# Patient Record
Sex: FEMALE | Race: WHITE | NOT HISPANIC OR LATINO | Employment: FULL TIME | URBAN - METROPOLITAN AREA
[De-identification: names, ages, dates, MRNs, and addresses within clinical notes are randomized per-mention and may not be internally consistent; named-entity substitution may affect disease eponyms.]

---

## 2021-02-24 ENCOUNTER — TELEPHONE (OUTPATIENT)
Dept: OBGYN CLINIC | Facility: CLINIC | Age: 26
End: 2021-02-24

## 2021-03-12 ENCOUNTER — OFFICE VISIT (OUTPATIENT)
Dept: OBGYN CLINIC | Facility: CLINIC | Age: 26
End: 2021-03-12
Payer: COMMERCIAL

## 2021-03-12 VITALS
WEIGHT: 234 LBS | DIASTOLIC BLOOD PRESSURE: 80 MMHG | TEMPERATURE: 33.8 F | BODY MASS INDEX: 43.06 KG/M2 | SYSTOLIC BLOOD PRESSURE: 128 MMHG | HEIGHT: 62 IN

## 2021-03-12 DIAGNOSIS — M76.31 ILIOTIBIAL BAND SYNDROME OF RIGHT SIDE: Primary | ICD-10-CM

## 2021-03-12 PROCEDURE — 99243 OFF/OP CNSLTJ NEW/EST LOW 30: CPT | Performed by: ORTHOPAEDIC SURGERY

## 2021-03-12 RX ORDER — LEVOTHYROXINE SODIUM 0.05 MG/1
50 TABLET ORAL DAILY
COMMUNITY
Start: 2021-02-17

## 2021-03-12 RX ORDER — BROMOCRIPTINE MESYLATE 5 MG/1
CAPSULE ORAL
COMMUNITY

## 2021-03-12 RX ORDER — CELECOXIB 100 MG/1
CAPSULE ORAL EVERY 24 HOURS
COMMUNITY
Start: 2021-03-10

## 2021-03-12 NOTE — PROGRESS NOTES
Assessment:     1  Iliotibial band syndrome of right side        Plan:     Problem List Items Addressed This Visit        Musculoskeletal and Integument    Iliotibial band syndrome of right side - Primary       Findings consistent with right leg IT band syndrome  Discussed findings and treatment options with the patient  Patient has localized tenderness with palpation over the lateral epicondyle and along the IT band  I provided patient cortisone injection over right knee lateral epicondyle, which patient tolerated well  I advised patient to continue use of the NSAID and attend physical therapy  I also advised patient to use over-the-counter Aspercreme or Voltaren gel over the leg  I will see patient back in 6 weeks for re-evaluation  Cold compress over the injection area  All patient's questions were answered to her satisfaction  This note is created using dictation transcription  It may contain typographical errors, grammatical errors, improperly dictated words, background noise and other errors  Relevant Orders    Ambulatory referral to Physical Therapy    Inj Trigger Point Single/Multiple (Completed)         Subjective:     Patient ID: Qi Brock is a 23 y o  female  Chief Complaint:    77-year-old female with new onset of right leg pain started more than a month ago  Patient's symptoms started after she ran and fell a popping sensation over her right leg  She started experiencing pain over her thigh down to her knee  The pain would radiate from the low area up toward the mid thigh  She denies pain in her back or groin  She is walking without use of any assistive device  She has been going to therapy for about 2 weeks now  Patient has been taking Celebrex that was prescribed by her family physician couple days ago  Information on patient's intake form was reviewed      Allergy:  Allergies   Allergen Reactions    Zithromax [Azithromycin] Rash     Medications:  all current active meds have been reviewed  Past Medical History:  Past Medical History:   Diagnosis Date    Disease of thyroid gland     hyperthyroid     Past Surgical History:  History reviewed  No pertinent surgical history  Family History:  Family History   Problem Relation Age of Onset    No Known Problems Mother      Social History:  Social History     Substance and Sexual Activity   Alcohol Use    Binge frequency: Less than monthly     Social History     Substance and Sexual Activity   Drug Use Not on file     Social History     Tobacco Use   Smoking Status Never Smoker   Smokeless Tobacco Current User     Review of Systems   Constitutional: Negative  HENT: Negative  Eyes: Negative  Respiratory: Negative  Cardiovascular: Negative  Gastrointestinal: Negative  Endocrine: Negative  Genitourinary: Negative  Musculoskeletal: Positive for arthralgias (Right upper leg)  Negative for gait problem and joint swelling  Skin: Negative  Allergic/Immunologic: Negative  Neurological: Negative  Hematological: Negative  Psychiatric/Behavioral: Negative  Objective:  BP Readings from Last 1 Encounters:   03/12/21 128/80      Wt Readings from Last 1 Encounters:   03/12/21 106 kg (234 lb)      BMI:   Estimated body mass index is 42 8 kg/m² as calculated from the following:    Height as of this encounter: 5' 2" (1 575 m)  Weight as of this encounter: 106 kg (234 lb)  BSA:   Estimated body surface area is 2 04 meters squared as calculated from the following:    Height as of this encounter: 5' 2" (1 575 m)  Weight as of this encounter: 106 kg (234 lb)  Physical Exam  Vitals signs and nursing note reviewed  Constitutional:       Appearance: She is well-developed  She is obese  HENT:      Head: Normocephalic and atraumatic  Right Ear: External ear normal       Left Ear: External ear normal    Eyes:      Extraocular Movements: Extraocular movements intact        Conjunctiva/sclera: Conjunctivae normal    Neck:      Musculoskeletal: Neck supple  Pulmonary:      Effort: Pulmonary effort is normal    Musculoskeletal:      Right knee: She exhibits no effusion  Skin:     General: Skin is warm and dry  Neurological:      Mental Status: She is alert and oriented to person, place, and time  Deep Tendon Reflexes: Reflexes are normal and symmetric  Psychiatric:         Mood and Affect: Mood normal          Behavior: Behavior normal        Right Knee Exam     Muscle Strength   The patient has normal right knee strength  Tenderness   Right knee tenderness location: Lateral epicondyle and along the IT band proximally  Range of Motion   The patient has normal right knee ROM  Tests   Haydee:  Medial - negative Lateral - negative  Varus: negative Valgus: negative  Patellar apprehension: negative    Other   Erythema: absent  Sensation: normal  Pulse: present  Swelling: none  Effusion: no effusion present      Right Hip Exam     Tenderness   The patient is experiencing no tenderness  Range of Motion   The patient has normal right hip ROM  Tests   CARLOS: negative  Ann-Marie: negative    Other   Erythema: absent  Sensation: normal  Pulse: present            No images for review today           Inj Trigger Point Single/Multiple     Date/Time 3/12/2021 2:49 PM     Performed by  Odessa Tom MD     Authorized by Odessa Tom MD      Universal Protocol   Consent: Verbal consent obtained  Risks and benefits: risks, benefits and alternatives were discussed  Consent given by: patient  Patient understanding: patient states understanding of the procedure being performed  Site marked: the operative site was marked      Local anesthesia used: no     Anesthesia   Local anesthesia used: no     Sedation   Patient sedated: no        Specimen: no    Culture: no   Procedure Details   Procedure Notes:  Right knee lateral epicondyle was injected with 1 cc 1% lidocaine and 0 5 cc Celestone    Injection was done under sterile condition  Patient tolerated the procedure well

## 2021-03-12 NOTE — ASSESSMENT & PLAN NOTE
Findings consistent with right leg IT band syndrome  Discussed findings and treatment options with the patient  Patient has localized tenderness with palpation over the lateral epicondyle and along the IT band  I provided patient cortisone injection over right knee lateral epicondyle, which patient tolerated well  I advised patient to continue use of the NSAID and attend physical therapy  I also advised patient to use over-the-counter Aspercreme or Voltaren gel over the leg  I will see patient back in 6 weeks for re-evaluation  Cold compress over the injection area  All patient's questions were answered to her satisfaction  This note is created using dictation transcription  It may contain typographical errors, grammatical errors, improperly dictated words, background noise and other errors

## 2021-04-21 ENCOUNTER — EVALUATION (OUTPATIENT)
Dept: PHYSICAL THERAPY | Facility: CLINIC | Age: 26
End: 2021-04-21
Payer: COMMERCIAL

## 2021-04-21 DIAGNOSIS — M76.31 ILIOTIBIAL BAND SYNDROME OF RIGHT SIDE: ICD-10-CM

## 2021-04-21 PROCEDURE — 97161 PT EVAL LOW COMPLEX 20 MIN: CPT | Performed by: PHYSICAL THERAPIST

## 2021-04-21 NOTE — PROGRESS NOTES
PT Evaluation     Today's date: 2021  Patient name: Hortencia Lester  : 1995  MRN: [id number]  Referring provider: Julius Funez MD  Dx:   Encounter Diagnosis     ICD-10-CM    1  Iliotibial band syndrome of right side  M76 31 Ambulatory referral to Physical Therapy                  Assessment  Assessment details: Hortencia Lester is a 27 y o  female presenting to outpatient physical therapy at [place] [address] with complaints of R lateral knee and thigh pain  She presents with decreased strength, limited ITB flexibility, poor patellar tracking, decreased tolerance to activity and decreased functional mobility due to Iliotibial band syndrome of right side  She would benefit from skilled PT services in order to address these deficits and reach maximum level of function  Thank you for the referral!  Impairments: abnormal or restricted ROM, activity intolerance, impaired physical strength, lacks appropriate home exercise program and pain with function  Barriers to therapy: None  Understanding of Dx/Px/POC: excellent  Goals  ST  Independent with HEP in 2 weeks  2  Good R patella tracking in 3 weeks     LT  Achieve FOTO score of 87/100 in 6 weeks   2  Able to get on and off of floor without R knee pain in 6 weeks  3  Strength R quadriceps and hip adductors = 5/5 in 6 weeks  4  No R ITB tightness in 6 weeks  5    No R knee tenderness in 6 weeks    Plan  Patient would benefit from: skilled PT and PT eval  Planned modality interventions: cryotherapy and thermotherapy: hydrocollator packs  Planned therapy interventions: ADL retraining, flexibility, functional ROM exercises, home exercise program, joint mobilization, manual therapy, neuromuscular re-education, postural training, strengthening, stretching, therapeutic activities and therapeutic exercise  Frequency: 2x week  Duration in weeks: 6  Treatment plan discussed with: patient        Subjective Evaluation    History of Present Illness  Mechanism of injury: Pt reports having R thigh pain since about 23 without cause  Her symptoms started after she ran and felt a popping sensation over her lateral R lateral knee  Her pain radiates from her knee up to her hip  She denies pain in her back or groin  She tried PT for about 2 weeks without pain relief  She had a lateral knee cortisone injection about 1 month ago with 2-3 days of pain relief  Works in   Unable to get on and off of floor due to lateral knee pain with min lateral thigh pain  Feels popping in lateral knee  Pt is morbidly obese  Recurrent probem    Quality of life: good    Pain  Current pain ratin  At best pain ratin  At worst pain ratin  Quality: tight and sharp  Aggravating factors: stair climbing and walking  Progression: no change    Social Support  Lives in: [place]  Lives with: parents and adult children    Employment status: working    Diagnostic Tests  No diagnostic tests performed  Treatments  Previous treatment: physical therapy, injection treatment and medication  Patient Goals  Patient goals for therapy: decreased pain, increased motion, increased strength, return to sport/leisure activities, independence with ADLs/IADLs and decreased edema          Objective     Observations     Right Knee   Negative for effusion  Tenderness     Right Knee   Tenderness in the ITB, lateral joint line and lateral patella  No tenderness in the medial joint line       Neurological Testing     Sensation     Knee   Left Knee   Intact: light touch    Right Knee   Intact: light touch     Active Range of Motion   Left Hip   Normal active range of motion    Right Hip   Normal active range of motion  Left Knee   Normal active range of motion    Right Knee   Normal active range of motion  Flexion: with pain    Mobility   Patellar Mobility:     Right Knee   WFL: superior and inferior  Hypermobile: lateral   Hypomobile: medial     Patellar Static Positioning     Additional Patellar Static Positioning Details  Mod lateral tracking R patella  Strength/Myotome Testing     Left Hip   Normal muscle strength    Right Hip   Planes of Motion   Flexion: 5  Extension: 5  Abduction: 5  Adduction: 4+    Left Knee   Flexion: 5  Extension: 5    Right Knee   Flexion: 5  Extension: 4+    Tests     Right Knee   Positive patellar compression  Negative Apley's compression, lateral Haydee and varus stress test at 0 degrees       Ambulation     Observational Gait   Gait: within functional limits              POC EXPIRES On:   6/2/21  PRECAUTIONS:  None  CO-MORBIDITES:  Morbid obesity  PERSONAL FACTORS:  None      Manuals HEP 4/21           Graston distal R ITB  8'           Medial patellar mobs to increase med glide  2'                                     Neuro Re-Ed                                                                                                Ther Ex    Standing R ITB stretch 4/21 20" 3           Supine strap R ITB stretch 4/21 20" 3           Supine SLR R             S/L R hip adduction             Bridging with ball adduction             TB R hip adduction                                       Ther Activity                              Gait Training                              Modalities

## 2021-04-23 ENCOUNTER — OFFICE VISIT (OUTPATIENT)
Dept: PHYSICAL THERAPY | Facility: CLINIC | Age: 26
End: 2021-04-23
Payer: COMMERCIAL

## 2021-04-23 DIAGNOSIS — M76.31 ILIOTIBIAL BAND SYNDROME OF RIGHT SIDE: Primary | ICD-10-CM

## 2021-04-23 PROCEDURE — 97110 THERAPEUTIC EXERCISES: CPT

## 2021-04-23 PROCEDURE — 97140 MANUAL THERAPY 1/> REGIONS: CPT

## 2021-04-23 NOTE — PROGRESS NOTES
Daily Note     Today's date: 2021  Patient name: Purvi Alex  : 1995  MRN: [id number]  Referring provider: Berta Mendieta MD  Dx:   Encounter Diagnosis     ICD-10-CM    1  Iliotibial band syndrome of right side  M76 31                   Subjective: Pt reports no real pain upon presentation  Notes more achiness in her R ITB region near her knee  Objective: See treatment diary below      Assessment: Tolerated treatment well  Added in CARLOS stretch manually as well as medial patella glide taping with good tolerance  Pt did demonstrate quad and adductor weakness but overall is improving  Patient demonstrated fatigue post treatment, exhibited good technique with therapeutic exercises and would benefit from continued PT      Plan: Continue per plan of care        POC EXPIRES On:   21  PRECAUTIONS:  None  CO-MORBIDITES:  Morbid obesity  PERSONAL FACTORS:  None      Manuals HEP           Graston distal R ITB  8' 8'          Medial patellar mobs to increase med glide  2' 2' PROM          ITB stretch   MM 3'          Patellar medial glide tape   4'                       Neuro Re-Ed                                                                                                Ther Ex    Standing R ITB stretch  20" 3 20"x3          Supine strap R ITB stretch  20" 3 20"x3          Supine SLR R   2x10          S/L R hip adduction   2x10          Bridging with ball adduction   2x10          TB R hip adduction   otb 2x10                                    Ther Activity                              Gait Training                              Modalities

## 2021-04-28 ENCOUNTER — APPOINTMENT (OUTPATIENT)
Dept: PHYSICAL THERAPY | Facility: CLINIC | Age: 26
End: 2021-04-28
Payer: COMMERCIAL

## 2021-04-30 ENCOUNTER — OFFICE VISIT (OUTPATIENT)
Dept: OBGYN CLINIC | Facility: CLINIC | Age: 26
End: 2021-04-30
Payer: COMMERCIAL

## 2021-04-30 ENCOUNTER — OFFICE VISIT (OUTPATIENT)
Dept: PHYSICAL THERAPY | Facility: CLINIC | Age: 26
End: 2021-04-30
Payer: COMMERCIAL

## 2021-04-30 VITALS
TEMPERATURE: 99.5 F | DIASTOLIC BLOOD PRESSURE: 78 MMHG | SYSTOLIC BLOOD PRESSURE: 116 MMHG | HEIGHT: 62 IN | BODY MASS INDEX: 44.16 KG/M2 | WEIGHT: 240 LBS

## 2021-04-30 DIAGNOSIS — M76.31 ILIOTIBIAL BAND SYNDROME OF RIGHT SIDE: Primary | ICD-10-CM

## 2021-04-30 PROCEDURE — 99213 OFFICE O/P EST LOW 20 MIN: CPT | Performed by: ORTHOPAEDIC SURGERY

## 2021-04-30 PROCEDURE — 97110 THERAPEUTIC EXERCISES: CPT | Performed by: PHYSICAL THERAPIST

## 2021-04-30 PROCEDURE — 97140 MANUAL THERAPY 1/> REGIONS: CPT | Performed by: PHYSICAL THERAPIST

## 2021-04-30 NOTE — PROGRESS NOTES
Assessment:     1  Iliotibial band syndrome of right side        Plan:    The patient was seen and examined by Dr Josee Gaffney and myself  Problem List Items Addressed This Visit        Musculoskeletal and Integument    Iliotibial band syndrome of right side - Primary       Findings consistent with right leg IT band syndrome  Findings and treatment options were discussed with the patient  Discussed that this condition will take more time to resolve with formal physical therapy  She is to continue to avoid high impact activities  Continue home exercises and stretches as well  Follow-up in 6-8 weeks for re-evaluation  All patient's questions were answered to her satisfaction  This note is created using dictation transcription  It may contain typographical errors, grammatical errors, improperly dictated words, background noise and other errors  Subjective:     Patient ID: Bharat Hwang is a 27 y o  female  Chief Complaint:    73-year-old   Female accompanied by her mother following up for right IT band syndrome  She was last seen about 6 weeks ago  She received a cortisone injection to the lateral epicondyle of the femur  Patient states she had complete pain relief for about 3 days  She did not start physical therapy until about a week ago  She continues to have pain over the lateral aspect of the distal femur  She has been avoiding high impact activities  Allergy:  Allergies   Allergen Reactions    Zithromax [Azithromycin] Rash     Medications:  all current active meds have been reviewed  Past Medical History:  Past Medical History:   Diagnosis Date    Disease of thyroid gland     hyperthyroid     Past Surgical History:  History reviewed  No pertinent surgical history    Family History:  Family History   Problem Relation Age of Onset    No Known Problems Mother      Social History:  Social History     Substance and Sexual Activity   Alcohol Use    Binge frequency: Less than monthly     Social History     Substance and Sexual Activity   Drug Use Not on file     Social History     Tobacco Use   Smoking Status Never Smoker   Smokeless Tobacco Current User     Review of Systems   Constitutional: Negative  HENT: Negative  Eyes: Negative  Respiratory: Negative  Cardiovascular: Negative  Gastrointestinal: Negative  Endocrine: Negative  Genitourinary: Negative  Musculoskeletal: Positive for arthralgias (Right upper leg)  Negative for gait problem and joint swelling  Skin: Negative  Allergic/Immunologic: Negative  Neurological: Negative  Hematological: Negative  Psychiatric/Behavioral: Negative  Objective:  BP Readings from Last 1 Encounters:   04/30/21 116/78      Wt Readings from Last 1 Encounters:   04/30/21 109 kg (240 lb)      BMI:   Estimated body mass index is 43 9 kg/m² as calculated from the following:    Height as of this encounter: 5' 2" (1 575 m)  Weight as of this encounter: 109 kg (240 lb)  BSA:   Estimated body surface area is 2 07 meters squared as calculated from the following:    Height as of this encounter: 5' 2" (1 575 m)  Weight as of this encounter: 109 kg (240 lb)  Physical Exam  Vitals signs and nursing note reviewed  Constitutional:       Appearance: She is well-developed  She is obese  HENT:      Head: Normocephalic and atraumatic  Right Ear: External ear normal       Left Ear: External ear normal    Eyes:      Extraocular Movements: Extraocular movements intact  Conjunctiva/sclera: Conjunctivae normal    Neck:      Musculoskeletal: Neck supple  Pulmonary:      Effort: Pulmonary effort is normal    Musculoskeletal:      Right knee: She exhibits no effusion  Skin:     General: Skin is warm and dry  Neurological:      Mental Status: She is alert and oriented to person, place, and time  Deep Tendon Reflexes: Reflexes are normal and symmetric     Psychiatric:         Mood and Affect: Mood normal          Behavior: Behavior normal        Right Knee Exam     Muscle Strength   The patient has normal right knee strength  Tenderness   Right knee tenderness location: Lateral epicondyle femur  Range of Motion   The patient has normal right knee ROM      Tests   Haydee:  Medial - negative Lateral - negative  Varus: negative Valgus: negative  Patellar apprehension: negative    Other   Erythema: absent  Sensation: normal  Pulse: present  Swelling: none  Effusion: no effusion present            No images for review today     Procedures

## 2021-04-30 NOTE — PROGRESS NOTES
Daily Note     Today's date: 2021  Patient name: Jerome Vazquez  : 1995  MRN: [id number]  Referring provider: Maribel Barrientos MD  Dx:   Encounter Diagnosis     ICD-10-CM    1  Iliotibial band syndrome of right side  M76 31                   Subjective: Pt says that her pain isn't as bad, but it still feels swollen  She notes that her R knee also pops when she walks  Objective: See treatment diary below      Assessment: Tolerated treatment fair, with high levels of symptom exacerbation  Patient displayed significant hypertonicity and TTP of R distal ITB, as well as distal Vastus Lateralis  Hip flexor decreased elasticity noted with significant pelvis tilting during prone quad stretching  She was unable to keep the knee in extension during S/L adduction and SLR, indicating quad weakness  She would benefit from continued skilled PT to address these deficits         Plan: Initiate quad strengthening     POC EXPIRES On:   21  PRECAUTIONS:  None  CO-MORBIDITES:  Morbid obesity  PERSONAL FACTORS:  None      Manuals HEP          Graston distal R ITB  8' 8' 8'+R quad         Medial patellar mobs to increase med glide  2' 2' PROM 2'         ITB stretch   MM 3' 5'         Patellar medial glide tape   4'                       Neuro Re-Ed                                                                                                Ther Ex    Stationary Bike    5'         Standing R ITB stretch  20" 3 20"x3 20" x2         Supine strap R ITB stretch  20" 3 20"x3 4 x20"         Supine SLR R   2x10 2 x10         S/L R hip adduction   2x10 2 x10         Bridging with ball adduction   2x10 2 x10         Prone quad stretch    4 x20"         TB R hip adduction   otb 2x10 np         LAQ    nv                      Ther Activity                              Gait Training                              Modalities

## 2021-04-30 NOTE — ASSESSMENT & PLAN NOTE
Findings consistent with right leg IT band syndrome  Findings and treatment options were discussed with the patient  Discussed that this condition will take more time to resolve with formal physical therapy  She is to continue to avoid high impact activities  Continue home exercises and stretches as well  Follow-up in 6-8 weeks for re-evaluation  All patient's questions were answered to her satisfaction  This note is created using dictation transcription  It may contain typographical errors, grammatical errors, improperly dictated words, background noise and other errors

## 2021-05-05 ENCOUNTER — APPOINTMENT (OUTPATIENT)
Dept: PHYSICAL THERAPY | Facility: CLINIC | Age: 26
End: 2021-05-05
Payer: COMMERCIAL

## 2021-05-07 ENCOUNTER — OFFICE VISIT (OUTPATIENT)
Dept: PHYSICAL THERAPY | Facility: CLINIC | Age: 26
End: 2021-05-07
Payer: COMMERCIAL

## 2021-05-07 DIAGNOSIS — M76.31 ILIOTIBIAL BAND SYNDROME OF RIGHT SIDE: Primary | ICD-10-CM

## 2021-05-07 PROCEDURE — 97140 MANUAL THERAPY 1/> REGIONS: CPT | Performed by: PHYSICAL THERAPIST

## 2021-05-07 PROCEDURE — 97110 THERAPEUTIC EXERCISES: CPT | Performed by: PHYSICAL THERAPIST

## 2021-05-07 NOTE — PROGRESS NOTES
Daily Note     Today's date: 2021  Patient name: Davide Choudhary  : 1995  MRN: [id number]  Referring provider: Nico Lepe MD  Dx:   Encounter Diagnosis     ICD-10-CM    1  Iliotibial band syndrome of right side  M76 31                   Subjective: Pt reports no new changes with her pain  She notes that she has been doing her HEP with her brother  Objective: See treatment diary below      Assessment: Tolerated treatment well  Patient displays significant R hip flexor vs  Adductor elasticity deficits, indicated by her inability to achieve full bridging despite glute and core activation  Following manual therapy the pt was able to achieve greater ROM in bridging  Pt unable to achieve hip extension in S/L secondary to anterior hip elasticity limitations, inhibiting her glute med ctivation, causing her to compensate with the TFL  Pt was given hip flexor and adductor stretches to add to HEP in order to correct these deficits  She would benefit from continued skilled PT in order to restore normal function         Plan: Glute med strengthening, address elastic tissue limitations      POC EXPIRES On:   21  PRECAUTIONS:  None  CO-MORBIDITES:  Morbid obesity  PERSONAL FACTORS:  None      Manuals HEP  5        Graston distal R ITB  8' 8' 8'+R quad         Medial patellar mobs to increase med glide  2' 2' PROM 2'         ITB stretch   MM 3' 5'         Patellar medial glide tape   4'          Psoas Release R     6'        R adductor stretch     4'                     Neuro Re-Ed                                                                                                Ther Ex    Stationary Bike    5' 6'        Standing R ITB stretch  20" 3 20"x3 20" x2 HEP        Standing Adductor stretch B/L     20"x4        Supine strap R ITB stretch  20" 3 20"x3 4 x20" HEP        James stretch R     3 x30"        Supine SLR R   2x10 2 x10         S/L R hip adduction   2x10 2 x10 2 x10 Bridging with ball adduction   2x10 2 x10 2 x10        Prone quad stretch    4 x20" HEP        TB R hip adduction   otb 2x10 np         LAQ B/L    nv x10                      Ther Activity                              Gait Training                              Modalities

## 2022-03-03 ENCOUNTER — HOSPITAL ENCOUNTER (EMERGENCY)
Facility: HOSPITAL | Age: 27
Discharge: HOME | End: 2022-03-04
Attending: EMERGENCY MEDICINE
Payer: COMMERCIAL

## 2022-03-03 ENCOUNTER — TELEPHONE (OUTPATIENT)
Dept: OBGYN CLINIC | Facility: CLINIC | Age: 27
End: 2022-03-03

## 2022-03-03 VITALS
BODY MASS INDEX: 44.16 KG/M2 | WEIGHT: 240 LBS | TEMPERATURE: 98.3 F | HEIGHT: 62 IN | HEART RATE: 88 BPM | DIASTOLIC BLOOD PRESSURE: 79 MMHG | OXYGEN SATURATION: 98 % | SYSTOLIC BLOOD PRESSURE: 172 MMHG | RESPIRATION RATE: 16 BRPM

## 2022-03-03 DIAGNOSIS — N93.8 DYSFUNCTIONAL UTERINE BLEEDING: Primary | ICD-10-CM

## 2022-03-03 DIAGNOSIS — I10 HYPERTENSION: ICD-10-CM

## 2022-03-03 PROCEDURE — 99284 EMERGENCY DEPT VISIT MOD MDM: CPT

## 2022-03-03 PROCEDURE — 81025 URINE PREGNANCY TEST: CPT | Performed by: EMERGENCY MEDICINE

## 2022-03-03 PROCEDURE — 99284 EMERGENCY DEPT VISIT MOD MDM: CPT | Performed by: EMERGENCY MEDICINE

## 2022-03-03 RX ORDER — DESOGESTREL AND ETHINYL ESTRADIOL 0.15-0.03
1 KIT ORAL DAILY
COMMUNITY

## 2022-03-03 NOTE — TELEPHONE ENCOUNTER
Patient states has a vaginal cyst that is growing, and for the past week passing clots  Stated prior gyn who she saw one time did not do anything for her  Will only see a female  Instructed to proceed to a [facility] for evaluation since she declined appointments offered with male providers

## 2022-03-04 LAB
EXT PREG TEST URINE: NEGATIVE
EXT. CONTROL ED NAV: NORMAL

## 2022-03-04 NOTE — ED PROVIDER NOTES
History  Chief Complaint   Patient presents with    Vaginal Bleeding     started period on , heavier than normal, medium sized clots, soaking through 2 pads an hour  28 yo F with PMH of PCOS, hypothyroid presents to ED with a week of heavy period  She is on ocp  She has had similar in past  She was a little lightheaded yesterday, but not today  No CP/SOB or syncope  No urinary sx or abd pain or cramping  No fevers  No N/V  No rashes/lesions  She didn't want to see a male gynecologist at her normal practice, so they recommended she come to ED for eval        History provided by:  Patient and medical records   used: No    Vaginal Bleeding  Quality:  Clots  Severity:  Moderate  Onset quality:  Gradual  Duration:  1 week  Timing:  Constant  Progression:  Unchanged  Chronicity:  Recurrent  Associated symptoms: no abdominal pain, no back pain, no dizziness, no fatigue, no fever and no nausea        Prior to Admission Medications   Prescriptions Last Dose Informant Patient Reported? Taking?   bromocriptine (PARLODEL) 5 MG capsule   Yes No   Si capsule   celecoxib (CeleBREX) 100 mg capsule   Yes No   Sig: every 24 hours   desogestrel-ethinyl estradiol (APRI) 0 15-30 MG-MCG per tablet   Yes Yes   Sig: Take 1 tablet by mouth daily   levothyroxine 50 mcg tablet   Yes No   Sig: Take 50 mcg by mouth daily   norethindrone (AYGESTIN) 5 mg tablet   Yes No   Sig: Take 5 mg by mouth daily      Facility-Administered Medications: None       Past Medical History:   Diagnosis Date    Disease of thyroid gland     hyperthyroid    Hypertension        History reviewed  No pertinent surgical history  Family History   Problem Relation Age of Onset    No Known Problems Mother      I have reviewed and agree with the history as documented      E-Cigarette/Vaping    E-Cigarette Use Never User      E-Cigarette/Vaping Substances    Nicotine No     THC No     CBD No     Flavoring No     Other No  Unknown No      Social History     Tobacco Use    Smoking status: Never Smoker    Smokeless tobacco: Current User   Vaping Use    Vaping Use: Never used   Substance Use Topics    Alcohol use: Yes     Comment: socially     Drug use: Never       Review of Systems   Constitutional: Negative for appetite change, chills, fatigue and fever  HENT: Negative for congestion, ear pain, rhinorrhea, sore throat, trouble swallowing and voice change  Eyes: Negative for pain and visual disturbance  Respiratory: Negative for cough, chest tightness and shortness of breath  Cardiovascular: Negative for chest pain, palpitations and leg swelling  Gastrointestinal: Negative for abdominal pain, blood in stool, constipation, diarrhea, nausea and vomiting  Genitourinary: Positive for vaginal bleeding  Negative for difficulty urinating and hematuria  Musculoskeletal: Negative for back pain, neck pain and neck stiffness  Skin: Negative for rash  Neurological: Negative for dizziness, syncope, speech difficulty, light-headedness and headaches  Psychiatric/Behavioral: Negative for confusion and suicidal ideas  Physical Exam  Physical Exam  Vitals and nursing note reviewed  Constitutional:       General: She is not in acute distress  Appearance: She is well-developed  She is obese  She is not ill-appearing, toxic-appearing or diaphoretic  HENT:      Head: Normocephalic and atraumatic  Right Ear: External ear normal       Left Ear: External ear normal       Nose: Nose normal    Eyes:      General: No scleral icterus  Right eye: No discharge  Left eye: No discharge  Conjunctiva/sclera: Conjunctivae normal       Pupils: Pupils are equal, round, and reactive to light  Neck:      Trachea: No tracheal deviation  Cardiovascular:      Rate and Rhythm: Normal rate and regular rhythm  Pulses: Normal pulses  Heart sounds: Normal heart sounds  No murmur heard    No friction rub  No gallop  Pulmonary:      Effort: Pulmonary effort is normal  No respiratory distress  Breath sounds: Normal breath sounds  No stridor  Chest:      Chest wall: No tenderness  Abdominal:      General: Bowel sounds are normal       Palpations: Abdomen is soft  Tenderness: There is no abdominal tenderness  There is no right CVA tenderness, guarding or rebound  Genitourinary:     Comments: Mild to moderate dark blood in vault  No hemorrhage or active bleeding  No lacerations or rash or tenderness  Musculoskeletal:         General: No deformity  Normal range of motion  Cervical back: Normal range of motion and neck supple  Lymphadenopathy:      Cervical: No cervical adenopathy  Skin:     General: Skin is warm and dry  Findings: No rash  Neurological:      General: No focal deficit present  Mental Status: She is alert and oriented to person, place, and time  Cranial Nerves: No cranial nerve deficit  Sensory: No sensory deficit        Coordination: Coordination normal    Psychiatric:         Behavior: Behavior normal          Vital Signs  ED Triage Vitals [03/03/22 2309]   Temperature Pulse Respirations Blood Pressure SpO2   98 3 °F (36 8 °C) 94 18 (!) 182/96 98 %      Temp Source Heart Rate Source Patient Position - Orthostatic VS BP Location FiO2 (%)   Oral Monitor Lying Right arm --      Pain Score       5           Vitals:    03/03/22 2309 03/03/22 2330   BP: (!) 182/96 (!) 172/79   Pulse: 94 88   Patient Position - Orthostatic VS: Lying          Visual Acuity      ED Medications  Medications - No data to display    Diagnostic Studies  Results Reviewed     Procedure Component Value Units Date/Time    POCT pregnancy, urine [[id number]]  (Normal) Resulted: 03/04/22 0004    Lab Status: Final result Updated: 03/04/22 0004     EXT PREG TEST UR (Ref: Negative) negative     Control valid                 No orders to display              Procedures  Procedures         ED Course  ED Course as of 03/04/22 0005   Thu Mar 03, 2022   2344 No hemorrhage on exam  Pt in no distress  Recurrent issue  Will check upreg and d/c home to f/u with GYN and PCP  Discussed motrin prn and RTED instructions  MDM  Number of Diagnoses or Management Options  Dysfunctional uterine bleeding: new and requires workup  Hypertension: new and requires workup     Amount and/or Complexity of Data Reviewed  Clinical lab tests: ordered and reviewed  Review and summarize past medical records: yes    Risk of Complications, Morbidity, and/or Mortality  Presenting problems: low  Diagnostic procedures: low  Management options: low    Patient Progress  Patient progress: stable      Disposition  Final diagnoses:   Dysfunctional uterine bleeding   Hypertension     Time reflects when diagnosis was documented in both MDM as applicable and the Disposition within this note     Time User Action Codes Description Comment    3/3/2022 11:43 PM Ivan Angela [N93 8] Dysfunctional uterine bleeding     3/3/2022 11:43 PM Ivan Angela [I10] Hypertension       ED Disposition     ED Disposition Condition Date/Time Comment    Discharge Stable Thu Mar 3, 2022 11:43 PM Barbara Rivera discharge to home/self care  Follow-up Information     Follow up With Specialties Details Why Contact Info Additional Information    Triston Robertson DO Family Medicine Schedule an appointment as soon as possible for a visit   [address] [facility] [facility] [address]  [facility] [address]  [facility] Emergency Department Emergency Medicine  If symptoms worsen [address] [place], [address]  [address] [facility], [address] [address]          Patient's Medications   Discharge Prescriptions    No medications on file       No discharge procedures on file      PDMP Review     None          ED Provider  Electronically Signed by           Aaron Leija MD  03/04/22 0005

## 2022-03-04 NOTE — ED NOTES
Pt reports getting period bi weekly for the last 1-2 months and takes her OCP daily around the same time        Alexandria Pelletier, RN  03/03/22 [address], RN  03/03/22 8553

## 2022-03-24 ENCOUNTER — TELEPHONE (OUTPATIENT)
Dept: PSYCHIATRY | Facility: CLINIC | Age: 27
End: 2022-03-24

## 2022-03-28 ENCOUNTER — OFFICE VISIT (OUTPATIENT)
Dept: OBGYN CLINIC | Facility: CLINIC | Age: 27
End: 2022-03-28
Payer: COMMERCIAL

## 2022-03-28 VITALS
WEIGHT: 216 LBS | HEIGHT: 61 IN | BODY MASS INDEX: 40.78 KG/M2 | DIASTOLIC BLOOD PRESSURE: 98 MMHG | SYSTOLIC BLOOD PRESSURE: 150 MMHG

## 2022-03-28 DIAGNOSIS — R79.89 ELEVATED PROLACTIN LEVEL: ICD-10-CM

## 2022-03-28 DIAGNOSIS — F41.9 ANXIETY: ICD-10-CM

## 2022-03-28 DIAGNOSIS — N91.2 AMENORRHEA: ICD-10-CM

## 2022-03-28 DIAGNOSIS — Z12.4 ENCOUNTER FOR PAPANICOLAOU SMEAR FOR CERVICAL CANCER SCREENING: ICD-10-CM

## 2022-03-28 DIAGNOSIS — Z11.3 SCREEN FOR STD (SEXUALLY TRANSMITTED DISEASE): ICD-10-CM

## 2022-03-28 DIAGNOSIS — R03.0 ELEVATED BP WITHOUT DIAGNOSIS OF HYPERTENSION: ICD-10-CM

## 2022-03-28 DIAGNOSIS — E03.9 HYPOTHYROIDISM, UNSPECIFIED TYPE: ICD-10-CM

## 2022-03-28 DIAGNOSIS — Z01.419 ENCOUNTER FOR GYNECOLOGICAL EXAMINATION WITHOUT ABNORMAL FINDING: Primary | ICD-10-CM

## 2022-03-28 LAB — SL AMB POCT URINE HCG: NEGATIVE

## 2022-03-28 PROCEDURE — 81025 URINE PREGNANCY TEST: CPT | Performed by: NURSE PRACTITIONER

## 2022-03-28 PROCEDURE — 99385 PREV VISIT NEW AGE 18-39: CPT | Performed by: NURSE PRACTITIONER

## 2022-03-28 RX ORDER — TRAZODONE HYDROCHLORIDE 50 MG/1
TABLET ORAL
COMMUNITY
Start: 2022-02-25

## 2022-03-28 NOTE — PROGRESS NOTES
Assessment/Plan:  Pap every 3 years if normal, STI testing as indicated, exercise most days of week, obtain appropriate diet and hydration, Calcium 1000mg + 600 vit D daily, birth control as directed  Annual mammogram starting at age 36, monthly breast self exam    Blood work to check Prolactin and thyroid function  Monitor cycles   Return 1 month for BP check if elevated will need to Discuss alternate birth control        Diagnoses and all orders for this visit:    Encounter for gynecological examination without abnormal finding  -     Thinprep Tis Pap Reflex HPV mRNA E6/E7, Chlamydia/N gonorrhoeae    Amenorrhea  -     POCT urine HCG    Encounter for Papanicolaou smear for cervical cancer screening  -     Thinprep Tis Pap Reflex HPV mRNA E6/E7, Chlamydia/N gonorrhoeae    Hypothyroidism, unspecified type  -     TSH, 3rd generation with Free T4 reflex; Future  -     TSH, 3rd generation with Free T4 reflex    Elevated prolactin level  -     Prolactin; Future  -     Prolactin    Screen for STD (sexually transmitted disease)  -     Thinprep Tis Pap Reflex HPV mRNA E6/E7, Chlamydia/N gonorrhoeae    Other orders  -     traZODone (DESYREL) 50 mg tablet          Subjective:      Patient ID: Lena Amezquita is a 24 y o  female  Here for annual gyn On OCP Last month period x 2 weeks Heavy clotty Started the week before the placebo pills Hast No period yet this month and is on last week of pill pack  H/o PCOS  H/o prolactinemia On Parlodel Denies nipple drainage  Hypothyroid on Synthroid  Saw endocrine last month is due for labs prior to return in May   She has been on OCP x 2years Periods lighter monthly normal Denies abdominal or pelvic pain  Bowel and bladder are normal  IS c/o breast tenderness   H/o vaginal cyst which was removed 2 years ago Thought it may be returning   Has never had a pap smear       The following portions of the patient's history were reviewed and updated as appropriate: allergies, current medications, past family history, past medical history, past social history, past surgical history and problem list     Review of Systems   Constitutional: Negative for chills and fever  Gastrointestinal: Negative for abdominal pain, constipation and diarrhea  Genitourinary: Positive for menstrual problem  Negative for dysuria, frequency and pelvic pain           Objective:      /98   Ht 5' 0 75" (1 543 m)   Wt 98 kg (216 lb)   LMP 02/12/2022 (Exact Date)   Breastfeeding No   BMI 41 15 kg/m²          Physical Exam

## 2022-03-28 NOTE — PATIENT INSTRUCTIONS
Pap every 3 years if normal, STI testing as indicated, exercise most days of week, obtain appropriate diet and hydration, Calcium 1000mg + 600 vit D daily, birth control as directed  Annual mammogram starting at age 36, monthly breast self exam    Blood work to check Prolactin and thyroid function  Monitor cycles   Return 1 month for BP check

## 2022-03-29 LAB
PROLACTIN SERPL-MCNC: 18.7 NG/ML
TSH SERPL-ACNC: 2.45 MIU/L

## 2022-03-30 ENCOUNTER — TELEPHONE (OUTPATIENT)
Dept: OBGYN CLINIC | Facility: CLINIC | Age: 27
End: 2022-03-30

## 2022-03-30 PROBLEM — E03.9 HYPOTHYROIDISM: Status: ACTIVE | Noted: 2022-03-30

## 2022-03-30 PROBLEM — F41.9 ANXIETY: Status: ACTIVE | Noted: 2022-03-30

## 2022-03-30 PROBLEM — R79.89 ELEVATED PROLACTIN LEVEL: Status: ACTIVE | Noted: 2022-03-30

## 2022-03-31 NOTE — TELEPHONE ENCOUNTER
Attempted to call pt and emergency contact left message on emergency contact's voicemail to have the pt contact the office

## 2022-03-31 NOTE — TELEPHONE ENCOUNTER
Pt returned a call to the office and reports her phone is not turned on and if you need to contact her, may call her boyfriends Sumanth's phone at [phone]  Pt's boyfriend was on the phone call at time of call  Medication clarification addressed, pt informed she was taking both OCP Apri and Aygestin,  reports she has been  taking Aygestin on heavy bleeding days, prescribed by Dr Magda Juarez, and informed  prescribing doctor was aware she was taking both  Pt was unable to recall location of prior GYN  Addressed concern for elevated BP and contradiction taking OCP with elevated BP and need to f/u with PCP  Pt informed she does not have an appointment with her PCP  Discussed with Kiesha Hilton  And has requested to have pt return for an OC  Pt scheduled to be seen 4/6/22 for BP check and medication consult

## 2022-03-31 NOTE — TELEPHONE ENCOUNTER
Have been trying to reach pt since visit unable to leave voice mail on pt cell  Called emergency contact requested pt to call us re medications  On OCP but also noted on list is Aygestin/Norethridone  Need to verify if she is really taking both medications If she is how long has she been on both and who prescribed the aygestin  Also BP high in office was anxious but need to repeat in 1 month Cannot stay on OCP if BP elevated Pt should f/u Primary care doctor for further eval Believe she is seeing him in May

## 2022-04-01 LAB
C TRACH RRNA SPEC QL NAA+PROBE: NOT DETECTED
CLINICAL INFO: NORMAL
CYTO CVX: NORMAL
CYTOLOGY CMNT CVX/VAG CYTO-IMP: NORMAL
DATE PREVIOUS BX: NORMAL
LMP START DATE: NORMAL
N GONORRHOEA RRNA SPEC QL NAA+PROBE: NOT DETECTED
SL AMB PREV. PAP:: NORMAL
SPECIMEN SOURCE CVX/VAG CYTO: NORMAL

## 2022-04-06 ENCOUNTER — OFFICE VISIT (OUTPATIENT)
Dept: OBGYN CLINIC | Facility: CLINIC | Age: 27
End: 2022-04-06
Payer: COMMERCIAL

## 2022-04-06 VITALS
HEIGHT: 60 IN | BODY MASS INDEX: 42.64 KG/M2 | DIASTOLIC BLOOD PRESSURE: 72 MMHG | WEIGHT: 217.2 LBS | SYSTOLIC BLOOD PRESSURE: 130 MMHG

## 2022-04-06 DIAGNOSIS — R03.0 BLOOD PRESSURE ELEVATED WITHOUT HISTORY OF HTN: ICD-10-CM

## 2022-04-06 DIAGNOSIS — N92.1 MENORRHAGIA WITH IRREGULAR CYCLE: Primary | ICD-10-CM

## 2022-04-06 DIAGNOSIS — Z30.011 ENCOUNTER FOR PRESCRIPTION OF ORAL CONTRACEPTIVES: ICD-10-CM

## 2022-04-06 PROCEDURE — 99213 OFFICE O/P EST LOW 20 MIN: CPT | Performed by: NURSE PRACTITIONER

## 2022-04-06 RX ORDER — BUPROPION HYDROCHLORIDE 300 MG/1
1 TABLET ORAL EVERY MORNING
COMMUNITY
Start: 2022-03-31

## 2022-04-06 RX ORDER — NORGESTIMATE AND ETHINYL ESTRADIOL 0.25-0.035
1 KIT ORAL DAILY
Qty: 28 TABLET | Refills: 3 | Status: SHIPPED | OUTPATIENT
Start: 2022-04-06

## 2022-04-06 NOTE — PATIENT INSTRUCTIONS
norethridone 1 three times a day x 2 days then twice a day for 2 days then daily until period stops   Once period stops start new birth control pill  Give 3 months to adjust  F/u 3-4 months for pill check to see how doing  Get US early next week if possible

## 2022-04-06 NOTE — PROGRESS NOTES
Assessment/Plan:  norethridone 1 three times a day x 2 days then twice a day for 2 days then daily until period stops  Once period stops start new birth control pill  Give 3 months to adjust  F/u 3-4 months for pill check to see how doing  Get US early next week if possible        Diagnoses and all orders for this visit:    Menorrhagia with irregular cycle  -     US pelvis complete w transvaginal; Future  -     norethindrone (Aygestin) 5 mg tablet; 1 tab 3 times per day x 2 days, then 1 tab bid x 2 days then 1 daily as directed    Blood pressure elevated without history of HTN    Encounter for prescription of oral contraceptives  -     norgestimate-ethinyl estradiol (Sprintec 28) 0 25-35 MG-MCG per tablet; Take 1 tablet by mouth daily    Other orders  -     buPROPion (WELLBUTRIN XL) 300 mg 24 hr tablet; Take 1 tablet by mouth every morning          Subjective:      Patient ID: Christina He is a 24 y o  female  Comes today for follow up to last exam with BP check  /98 at last visit  Has been on OCP x 2 years At visit was also taking aygestin which she was given to take on the heavy days of period She had been doing this for the past year  She has not had an US to evaluate uterus  She did have labs which were normal       The following portions of the patient's history were reviewed and updated as appropriate: allergies, current medications, past family history, past medical history, past social history, past surgical history and problem list     Review of Systems   Constitutional: Negative for fatigue  Gastrointestinal: Negative for abdominal pain, constipation and diarrhea  Genitourinary: Positive for menstrual problem and vaginal bleeding  Negative for frequency  Neurological: Negative for dizziness and light-headedness  Psychiatric/Behavioral: Negative for dysphoric mood  The patient is not nervous/anxious            Objective:      /72 (BP Location: Left arm, Patient Position: Sitting, Cuff Size: Large)   Ht 5' (1 524 m)   Wt 98 5 kg (217 lb 3 2 oz)   LMP 03/12/2022 (Exact Date) Comment: lasted for 2 weeks   Breastfeeding No   BMI 42 42 kg/m²          Physical Exam  Constitutional:       Appearance: Normal appearance  HENT:      Head: Normocephalic and atraumatic  Neurological:      General: No focal deficit present  Mental Status: She is alert and oriented to person, place, and time     Psychiatric:         Mood and Affect: Mood normal          Behavior: Behavior normal

## 2022-04-11 ENCOUNTER — TELEPHONE (OUTPATIENT)
Dept: OBGYN CLINIC | Facility: CLINIC | Age: 27
End: 2022-04-11

## 2022-04-20 ENCOUNTER — HOSPITAL ENCOUNTER (EMERGENCY)
Facility: HOSPITAL | Age: 27
End: 2022-04-21
Attending: EMERGENCY MEDICINE
Payer: COMMERCIAL

## 2022-04-20 DIAGNOSIS — F32.A DEPRESSION: Primary | ICD-10-CM

## 2022-04-20 LAB
AMPHETAMINES SERPL QL SCN: NEGATIVE
BARBITURATES UR QL: NEGATIVE
BENZODIAZ UR QL: NEGATIVE
COCAINE UR QL: NEGATIVE
ETHANOL EXG-MCNC: 0 MG/DL
EXT PREG TEST URINE: NEGATIVE
EXT. CONTROL ED NAV: NORMAL
FLUAV RNA RESP QL NAA+PROBE: NEGATIVE
FLUBV RNA RESP QL NAA+PROBE: NEGATIVE
METHADONE UR QL: NEGATIVE
OPIATES UR QL SCN: NEGATIVE
OXYCODONE+OXYMORPHONE UR QL SCN: NEGATIVE
PCP UR QL: NEGATIVE
RSV RNA RESP QL NAA+PROBE: NEGATIVE
SARS-COV-2 RNA RESP QL NAA+PROBE: NEGATIVE
THC UR QL: NEGATIVE

## 2022-04-20 PROCEDURE — 80307 DRUG TEST PRSMV CHEM ANLYZR: CPT | Performed by: EMERGENCY MEDICINE

## 2022-04-20 PROCEDURE — 81025 URINE PREGNANCY TEST: CPT | Performed by: EMERGENCY MEDICINE

## 2022-04-20 PROCEDURE — 0241U HB NFCT DS VIR RESP RNA 4 TRGT: CPT | Performed by: EMERGENCY MEDICINE

## 2022-04-20 PROCEDURE — 82075 ASSAY OF BREATH ETHANOL: CPT | Performed by: EMERGENCY MEDICINE

## 2022-04-20 PROCEDURE — 99285 EMERGENCY DEPT VISIT HI MDM: CPT | Performed by: EMERGENCY MEDICINE

## 2022-04-20 PROCEDURE — 99285 EMERGENCY DEPT VISIT HI MDM: CPT

## 2022-04-21 VITALS
SYSTOLIC BLOOD PRESSURE: 132 MMHG | WEIGHT: 217 LBS | HEIGHT: 60 IN | BODY MASS INDEX: 42.6 KG/M2 | RESPIRATION RATE: 20 BRPM | HEART RATE: 69 BPM | TEMPERATURE: 98.5 F | DIASTOLIC BLOOD PRESSURE: 70 MMHG | OXYGEN SATURATION: 100 %

## 2022-04-21 NOTE — ED NOTES
Patient is accepted at [facility]  Patient is accepted by Dr Cielo Yusuf per Karel [place]  Transportation is arranged with [facility] Turnerers)  Transportation is scheduled for 12:30 with CTS  Patient may go to the floor any time after 11:00          Nurse report was not requested

## 2022-04-21 NOTE — ED NOTES
PC félix Knutson, [phone], will accept for AM discharge bed  Accepting is Dr Gerard Dias  Please call between 7-7:30 to find out when it would be appropriate to schedule for admission  Will need updated AM vitals but otherwise Pt is ready for admission
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
## 2022-04-21 NOTE — EMTALA/ACUTE CARE TRANSFER
[facility] EMERGENCY DEPARTMENT  [address]  [address] [place] [address]  Dept: [phone]      EMTALA TRANSFER CONSENT    NAME Bam Funk                                          1995                              MRN [id number]    I have been informed of my rights regarding examination, treatment, and transfer   by Dr Rincon att  providers found    Benefits: Continuity of care    Risks: Potential for delay in receiving treatment,Potential deterioration of medical condition,Increased discomfort during transfer,Possible worsening of condition or death during transfer      Transfer Request   I acknowledge that my medical condition has been evaluated and explained to me by the emergency department physician or other qualified medical person and/or my attending physician who has recommended and offered to me further medical examination and treatment  I understand the Hospital's obligation with respect to the treatment and stabilization of my emergency medical condition  I nevertheless request to be transferred  I release the Hospital, the doctor, and any other persons caring for me from all responsibility or liability for any injury or ill effects that may result from my transfer and agree to accept all responsibility for the consequences of my choice to transfer, rather than receive stabilizing treatment at the Hospital  I understand that because the transfer is my request, my insurance may not provide reimbursement for the services  The Hospital will assist and direct me and my family in how to make arrangements for transfer, but the hospital is not liable for any fees charged by the transport service  In spite of this understanding, I refuse to consent to further medical examination and treatment which has been offered to me, and request transfer to [address] [place], [place] [address]  Name, [facility] : Nahomi Ford   I authorize the performance of emergency medical procedures and treatments upon me in both transit and upon arrival at the receiving facility  Additionally, I authorize the release of any and all medical records to the receiving facility and request they be transported with me, if possible  I authorize the performance of emergency medical procedures and treatments upon me in both transit and upon arrival at the receiving facility  Additionally, I authorize the release of any and all medical records to the receiving facility and request they be transported with me, if possible  I understand that the safest mode of transportation during a medical emergency is an ambulance and that the Hospital advocates the use of this mode of transport  Risks of traveling to the receiving facility by car, including absence of medical control, life sustaining equipment, such as oxygen, and medical personnel has been explained to me and I fully understand them  (KARIN CORRECT BOX BELOW)  [  ]  I consent to the stated transfer and to be transported by ambulance/helicopter  [  ]  I consent to the stated transfer, but refuse transportation by ambulance and accept full responsibility for my transportation by car  I understand the risks of non-ambulance transfers and I exonerate the Hospital and its staff from any deterioration in my condition that results from this refusal     X___________________________________________    DATE  22  TIME________  Signature of patient or legally responsible individual signing on patient behalf           RELATIONSHIP TO PATIENT_________________________          Provider Certification    NAME Sayra Matamoros                                         1995                              MRN [id number]    A medical screening exam was performed on the above named patient  Based on the examination:    Condition Necessitating Transfer The encounter diagnosis was Depression      Patient Condition: The patient has been stabilized such that within reasonable medical probability, no material deterioration of the patient condition or the condition of the unborn child(peace) is likely to result from the transfer    Reason for Transfer: Level of Care needed not available at this facility    Transfer Requirements: [facility]   · Space available and qualified personnel available for treatment as acknowledged by    · Agreed to accept transfer and to provide appropriate medical treatment as acknowledged by       Dr Kole Millan  · Appropriate medical records of the examination and treatment of the patient are provided at the time of transfer   [facility] [address] _______  · Transfer will be performed by qualified personnel from    and appropriate transfer equipment as required, including the use of necessary and appropriate life support measures  Provider Certification: I have examined the patient and explained the following risks and benefits of being transferred/refusing transfer to the patient/family:         Based on these reasonable risks and benefits to the patient and/or the unborn child(peace), and based upon the information available at the time of the patients examination, I certify that the medical benefits reasonably to be expected from the provision of appropriate medical treatments at another medical facility outweigh the increasing risks, if any, to the individuals medical condition, and in the case of labor to the unborn child, from effecting the transfer      X____________________________________________ DATE 04/21/22        TIME_______      ORIGINAL - SEND TO MEDICAL RECORDS   COPY - SEND WITH PATIENT DURING TRANSFER

## 2022-04-21 NOTE — ED NOTES
36 Petition Testimony:    "Leila texted me saying that she was worthless, doesn't belong here anymore, she said she wants to run out in traffic get hit by a car  She then sends me a picture of knife and than the marks on her wrist from cutting herself, then proceeds to tell me she most likely wont be janiya when I get home from work  She also has been struggling with cutting for a long time  She will use anything to hurt herself   She doesn't take prescribed meds anymore and refused to get help "

## 2022-04-21 NOTE — ED NOTES
Bed Search:    Elaine Linker Wali Lucas) faxed clinical  Eddolores Obrien) faxed clinical  Arleth Mejía) faxed clinical  [facility] Pia Mcwilliams) faxed clinical  Enrrique Pierre) faxed clinical  [place] ([facility]) no beds  [place] Manual) faxed clinical  Bebo Michaels) faxed for review  Emmett RAMIREZ) faxed clinical  [place] (Loretta France) no beds  [address]) no beds  [place] ([place]) no beds

## 2023-01-17 ENCOUNTER — HOSPITAL ENCOUNTER (EMERGENCY)
Facility: HOSPITAL | Age: 28
Discharge: HOME | End: 2023-01-17
Attending: EMERGENCY MEDICINE

## 2023-01-17 ENCOUNTER — APPOINTMENT (EMERGENCY)
Dept: CT IMAGING | Facility: HOSPITAL | Age: 28
End: 2023-01-17

## 2023-01-17 VITALS
OXYGEN SATURATION: 98 % | RESPIRATION RATE: 18 BRPM | DIASTOLIC BLOOD PRESSURE: 71 MMHG | BODY MASS INDEX: 48.44 KG/M2 | HEART RATE: 88 BPM | TEMPERATURE: 98.5 F | SYSTOLIC BLOOD PRESSURE: 135 MMHG | WEIGHT: 248.02 LBS

## 2023-01-17 DIAGNOSIS — R10.9 ABDOMINAL PAIN: ICD-10-CM

## 2023-01-17 DIAGNOSIS — R19.7 DIARRHEA: Primary | ICD-10-CM

## 2023-01-17 DIAGNOSIS — E83.42 HYPOMAGNESEMIA: ICD-10-CM

## 2023-01-17 DIAGNOSIS — K76.0 FATTY LIVER: ICD-10-CM

## 2023-01-17 LAB
ALBUMIN SERPL BCP-MCNC: 4.2 G/DL (ref 3.5–5)
ALP SERPL-CCNC: 60 U/L (ref 34–104)
ALT SERPL W P-5'-P-CCNC: 17 U/L (ref 7–52)
ANION GAP SERPL CALCULATED.3IONS-SCNC: 10 MMOL/L (ref 4–13)
AST SERPL W P-5'-P-CCNC: 26 U/L (ref 13–39)
BACTERIA UR QL AUTO: ABNORMAL /HPF
BASOPHILS # BLD AUTO: 0.04 THOUSANDS/ÂΜL (ref 0–0.1)
BASOPHILS NFR BLD AUTO: 0 % (ref 0–1)
BILIRUB SERPL-MCNC: 0.57 MG/DL (ref 0.2–1)
BILIRUB UR QL STRIP: NEGATIVE
BUN SERPL-MCNC: 10 MG/DL (ref 5–25)
CALCIUM SERPL-MCNC: 8.9 MG/DL (ref 8.4–10.2)
CHLORIDE SERPL-SCNC: 104 MMOL/L (ref 96–108)
CLARITY UR: CLEAR
CO2 SERPL-SCNC: 22 MMOL/L (ref 21–32)
COLOR UR: YELLOW
CREAT SERPL-MCNC: 0.58 MG/DL (ref 0.6–1.3)
EOSINOPHIL # BLD AUTO: 0.25 THOUSAND/ÂΜL (ref 0–0.61)
EOSINOPHIL NFR BLD AUTO: 2 % (ref 0–6)
ERYTHROCYTE [DISTWIDTH] IN BLOOD BY AUTOMATED COUNT: 12.4 % (ref 11.6–15.1)
EXT PREGNANCY TEST URINE: NEGATIVE
EXT. CONTROL: NORMAL
GFR SERPL CREATININE-BSD FRML MDRD: 128 ML/MIN/1.73SQ M
GLUCOSE SERPL-MCNC: 79 MG/DL (ref 65–140)
GLUCOSE UR STRIP-MCNC: NEGATIVE MG/DL
HCT VFR BLD AUTO: 39.3 % (ref 34.8–46.1)
HGB BLD-MCNC: 13.6 G/DL (ref 11.5–15.4)
HGB UR QL STRIP.AUTO: ABNORMAL
IMM GRANULOCYTES # BLD AUTO: 0.05 THOUSAND/UL (ref 0–0.2)
IMM GRANULOCYTES NFR BLD AUTO: 1 % (ref 0–2)
KETONES UR STRIP-MCNC: NEGATIVE MG/DL
LACTATE SERPL-SCNC: 1 MMOL/L (ref 0.5–2)
LEUKOCYTE ESTERASE UR QL STRIP: NEGATIVE
LYMPHOCYTES # BLD AUTO: 3.65 THOUSANDS/ÂΜL (ref 0.6–4.47)
LYMPHOCYTES NFR BLD AUTO: 35 % (ref 14–44)
MAGNESIUM SERPL-MCNC: 1.8 MG/DL (ref 1.9–2.7)
MCH RBC QN AUTO: 30.6 PG (ref 26.8–34.3)
MCHC RBC AUTO-ENTMCNC: 34.6 G/DL (ref 31.4–37.4)
MCV RBC AUTO: 88 FL (ref 82–98)
MONOCYTES # BLD AUTO: 0.62 THOUSAND/ÂΜL (ref 0.17–1.22)
MONOCYTES NFR BLD AUTO: 6 % (ref 4–12)
NEUTROPHILS # BLD AUTO: 5.82 THOUSANDS/ÂΜL (ref 1.85–7.62)
NEUTS SEG NFR BLD AUTO: 56 % (ref 43–75)
NITRITE UR QL STRIP: NEGATIVE
NON-SQ EPI CELLS URNS QL MICRO: ABNORMAL /HPF
NRBC BLD AUTO-RTO: 0 /100 WBCS
PH UR STRIP.AUTO: 6 [PH] (ref 4.5–8)
PLATELET # BLD AUTO: 251 THOUSANDS/UL (ref 149–390)
PMV BLD AUTO: 10.2 FL (ref 8.9–12.7)
POTASSIUM SERPL-SCNC: 4 MMOL/L (ref 3.5–5.3)
PROT SERPL-MCNC: 7 G/DL (ref 6.4–8.4)
PROT UR STRIP-MCNC: NEGATIVE MG/DL
RBC # BLD AUTO: 4.45 MILLION/UL (ref 3.81–5.12)
RBC #/AREA URNS AUTO: ABNORMAL /HPF
SODIUM SERPL-SCNC: 136 MMOL/L (ref 135–147)
SP GR UR STRIP.AUTO: 1.02 (ref 1–1.03)
UROBILINOGEN UR QL STRIP.AUTO: 0.2 E.U./DL
WBC # BLD AUTO: 10.43 THOUSAND/UL (ref 4.31–10.16)
WBC #/AREA URNS AUTO: ABNORMAL /HPF

## 2023-01-17 RX ORDER — DICYCLOMINE HCL 20 MG
20 TABLET ORAL 2 TIMES DAILY PRN
Qty: 4 TABLET | Refills: 0 | Status: SHIPPED | OUTPATIENT
Start: 2023-01-17 | End: 2023-01-21

## 2023-01-17 RX ORDER — DICYCLOMINE HCL 20 MG
20 TABLET ORAL ONCE
Status: COMPLETED | OUTPATIENT
Start: 2023-01-17 | End: 2023-01-17

## 2023-01-17 RX ADMIN — IOHEXOL 100 ML: 350 INJECTION, SOLUTION INTRAVENOUS at 22:28

## 2023-01-17 RX ADMIN — MAGNESIUM OXIDE TAB 400 MG (241.3 MG ELEMENTAL MG) 800 MG: 400 (241.3 MG) TAB at 22:59

## 2023-01-17 RX ADMIN — DICYCLOMINE HYDROCHLORIDE 20 MG: 20 TABLET ORAL at 21:58

## 2023-01-17 RX ADMIN — SODIUM CHLORIDE 1000 ML: 0.9 INJECTION, SOLUTION INTRAVENOUS at 21:40

## 2023-01-17 NOTE — Clinical Note
Becky Watson was seen and treated in our emergency department on 1/17/2023  Diagnosis:     Leila    She may return on this date: 01/19/2023         If you have any questions or concerns, please don't hesitate to call        Jayden Mendez DO    ______________________________           _______________          _______________  Hospital Representative                              Date                                Time

## 2023-01-18 ENCOUNTER — APPOINTMENT (OUTPATIENT)
Dept: LAB | Facility: HOSPITAL | Age: 28
End: 2023-01-18
Attending: EMERGENCY MEDICINE

## 2023-01-18 DIAGNOSIS — R19.7 DIARRHEA OF PRESUMED INFECTIOUS ORIGIN: ICD-10-CM

## 2023-01-18 DIAGNOSIS — R19.7 DIARRHEA: ICD-10-CM

## 2023-01-18 NOTE — ED PROVIDER NOTES
History  Chief Complaint   Patient presents with   • Abdominal Pain     Pt reports diarrhea for 1 month with abdominal pain  Pt reports black stool tonight  Pt reports she took Pepto last night which offers some relief  Patient is a 22-year-old female with a history of anxiety, hypertension and PCOS coming in today complaining of abdominal pain, diarrhea for the past 6 months  She states that really started with loose watery stools and persisted up until this time  Times are green and sometimes are mucousy  Also reports she has been having intermittent abdominal pain described as cramping  She reports that she was went to her GYN who suggested she see a gastroenterologist   She states she has not made that appointment the past 6 months because she has been busy  She denies any melena or bright red blood per rectum  However, last night she took Pepto-Bismol and had black stools today  She had never had this before  She has no nausea, vomiting, fevers, chills  She is able to tolerate p o  but "it comes right out"  She has no recent travel, drinking out of streams  No recent antibiotic use  There is no history of Crohn's or ulcerative colitis or inflammatory bowel disease in the family    She is never had a colonoscopy or endoscopy      History provided by:  Patient   used: No    Diarrhea  Quality:  Mucous and semi-solid  Severity:  Moderate  Onset quality:  Gradual  Duration:  6 months  Timing:  Unable to specify  Progression:  Unchanged  Relieved by:  None tried  Worsened by:  Nothing  Ineffective treatments:  None tried  Associated symptoms: abdominal pain    Associated symptoms: no arthralgias, no chills, no recent cough, no diaphoresis, no fever, no headaches, no myalgias, no URI and no vomiting    Risk factors: no recent antibiotic use, no sick contacts, no suspicious food intake and no travel to endemic areas        Prior to Admission Medications   Prescriptions Last Dose Informant Patient Reported? Taking?   bromocriptine (PARLODEL) 5 MG capsule   Yes No   Si capsule   Patient not taking: Reported on 2022   buPROPion (WELLBUTRIN XL) 300 mg 24 hr tablet   Yes No   Sig: Take 1 tablet by mouth every morning   celecoxib (CeleBREX) 100 mg capsule   Yes No   Sig: every 24 hours   Patient not taking: Reported on 2022    desogestrel-ethinyl estradiol (APRI) 0 15-30 MG-MCG per tablet   Yes No   Sig: Take 1 tablet by mouth daily   levothyroxine 50 mcg tablet   Yes No   Sig: Take 50 mcg by mouth daily   norethindrone (AYGESTIN) 5 mg tablet   Yes No   Sig: Take 5 mg by mouth daily   norethindrone (Aygestin) 5 mg tablet   No No   Si tab 3 times per day x 2 days, then 1 tab bid x 2 days then 1 daily as directed   norgestimate-ethinyl estradiol (Sprintec 28) 0 25-35 MG-MCG per tablet   No No   Sig: Take 1 tablet by mouth daily   traZODone (DESYREL) 50 mg tablet   Yes No      Facility-Administered Medications: None       Past Medical History:   Diagnosis Date   • Anxiety    • Disease of thyroid gland     hyperthyroid   • Elevated prolactin level    • Hypertension    • PCOS (polycystic ovarian syndrome)        History reviewed  No pertinent surgical history  Family History   Problem Relation Age of Onset   • No Known Problems Mother    • Breast cancer Neg Hx    • Uterine cancer Neg Hx    • Ovarian cancer Neg Hx      I have reviewed and agree with the history as documented  E-Cigarette/Vaping   • E-Cigarette Use Never User      E-Cigarette/Vaping Substances   • Nicotine No    • THC No    • CBD No    • Flavoring No    • Other No    • Unknown No      Social History     Tobacco Use   • Smoking status: Never   • Smokeless tobacco: Never   Vaping Use   • Vaping Use: Never used   Substance Use Topics   • Alcohol use: Yes     Comment: socially    • Drug use: Never       Review of Systems   Constitutional: Negative  Negative for chills, diaphoresis and fever  HENT: Negative  Negative for ear pain and sore throat  Eyes: Negative  Negative for pain and visual disturbance  Respiratory: Negative  Negative for cough and shortness of breath  Cardiovascular: Negative  Negative for chest pain and palpitations  Gastrointestinal: Positive for abdominal pain and diarrhea  Negative for vomiting  Genitourinary: Negative  Negative for dysuria and hematuria  Musculoskeletal: Negative  Negative for arthralgias, back pain and myalgias  Skin: Negative  Negative for color change and rash  Neurological: Negative  Negative for seizures, syncope and headaches  Hematological: Negative  Psychiatric/Behavioral: Negative  All other systems reviewed and are negative  Physical Exam  Physical Exam  Vitals and nursing note reviewed  Constitutional:       General: She is not in acute distress  Appearance: She is well-developed  HENT:      Head: Normocephalic and atraumatic  Comments: Patient maintaining airway and secretions  No stridor   No brawniness under tongue  Eyes:      Conjunctiva/sclera: Conjunctivae normal    Cardiovascular:      Rate and Rhythm: Normal rate and regular rhythm  Pulses: Normal pulses  Radial pulses are 2+ on the right side and 2+ on the left side  Dorsalis pedis pulses are 2+ on the right side and 2+ on the left side  Heart sounds: Normal heart sounds, S1 normal and S2 normal  No murmur heard  Pulmonary:      Effort: Pulmonary effort is normal  No respiratory distress  Breath sounds: Normal breath sounds  Abdominal:      General: Abdomen is protuberant  Bowel sounds are normal       Palpations: Abdomen is soft  Tenderness: There is generalized abdominal tenderness  There is no right CVA tenderness, left CVA tenderness, guarding or rebound  Negative signs include Varela's sign, Rovsing's sign and McBurney's sign  Musculoskeletal:         General: No swelling  Cervical back: Neck supple  Right lower leg: No edema  Left lower leg: No edema  Skin:     General: Skin is warm and dry  Capillary Refill: Capillary refill takes less than 2 seconds  Neurological:      General: No focal deficit present  Mental Status: She is alert and oriented to person, place, and time  GCS: GCS eye subscore is 4  GCS verbal subscore is 5  GCS motor subscore is 6  Cranial Nerves: Cranial nerves 2-12 are intact  Sensory: Sensation is intact  Motor: Motor function is intact  Coordination: Coordination is intact  Gait: Gait is intact     Psychiatric:         Mood and Affect: Mood normal          Behavior: Behavior normal          Vital Signs  ED Triage Vitals   Temperature Pulse Respirations Blood Pressure SpO2   01/17/23 1915 01/17/23 1915 01/17/23 1915 01/17/23 1915 01/17/23 1915   98 5 °F (36 9 °C) 78 16 158/83 100 %      Temp Source Heart Rate Source Patient Position - Orthostatic VS BP Location FiO2 (%)   01/17/23 1915 01/17/23 2153 01/17/23 2153 01/17/23 2153 --   Oral Monitor Sitting Right arm       Pain Score       01/17/23 1915       5           Vitals:    01/17/23 1915 01/17/23 2153 01/17/23 2317   BP: 158/83 144/74 135/71   Pulse: 78 81 88   Patient Position - Orthostatic VS:  Sitting Lying         Visual Acuity      ED Medications  Medications   sodium chloride 0 9 % bolus 1,000 mL (0 mL Intravenous Stopped 1/17/23 2247)   dicyclomine (BENTYL) tablet 20 mg (20 mg Oral Given 1/17/23 2158)   magnesium oxide (MAG-OX) tablet 800 mg (800 mg Oral Given 1/17/23 2259)   iohexol (OMNIPAQUE) 350 MG/ML injection (SINGLE-DOSE) 100 mL (100 mL Intravenous Given 1/17/23 2228)       Diagnostic Studies  Results Reviewed     Procedure Component Value Units Date/Time    Comprehensive metabolic panel [[id number]]  (Abnormal) Collected: 01/17/23 2130    Lab Status: Final result Specimen: Blood from Arm, Right Updated: 01/17/23 2211     Sodium 136 mmol/L      Potassium 4 0 mmol/L Chloride 104 mmol/L      CO2 22 mmol/L      ANION GAP 10 mmol/L      BUN 10 mg/dL      Creatinine 0 58 mg/dL      Glucose 79 mg/dL      Calcium 8 9 mg/dL      AST 26 U/L      ALT 17 U/L      Alkaline Phosphatase 60 U/L      Total Protein 7 0 g/dL      Albumin 4 2 g/dL      Total Bilirubin 0 57 mg/dL      eGFR 128 ml/min/1 73sq m     Narrative:      [place] guidelines for Chronic Kidney Disease (CKD):   •  Stage 1 with normal or high GFR (GFR > 90 mL/min/1 73 square meters)  •  Stage 2 Mild CKD (GFR = 60-89 mL/min/1 73 square meters)  •  Stage 3A Moderate CKD (GFR = 45-59 mL/min/1 73 square meters)  •  Stage 3B Moderate CKD (GFR = 30-44 mL/min/1 73 square meters)  •  Stage 4 Severe CKD (GFR = 15-29 mL/min/1 73 square meters)  •  Stage 5 End Stage CKD (GFR <15 mL/min/1 73 square meters)  Note: GFR calculation is accurate only with a steady state creatinine    Magnesium [[id number]]  (Abnormal) Collected: 01/17/23 2130    Lab Status: Final result Specimen: Blood from Arm, Right Updated: 01/17/23 2211     Magnesium 1 8 mg/dL     Lactic acid [[id number]]  (Normal) Collected: 01/17/23 2130    Lab Status: Final result Specimen: Blood from Arm, Right Updated: 01/17/23 2210     LACTIC ACID 1 0 mmol/L     Narrative:      Result may be elevated if tourniquet was used during collection      Urine Microscopic [[id number]]  (Abnormal) Collected: 01/17/23 2032    Lab Status: Final result Specimen: Urine, Clean Catch Updated: 01/17/23 2202     RBC, UA 0-1 /hpf      WBC, UA 1-2 /hpf      Epithelial Cells Occasional /hpf      Bacteria, UA Occasional /hpf     CBC and differential [[id number]]  (Abnormal) Collected: 01/17/23 2130    Lab Status: Final result Specimen: Blood from Arm, Right Updated: 01/17/23 2140     WBC 10 43 Thousand/uL      RBC 4 45 Million/uL      Hemoglobin 13 6 g/dL      Hematocrit 39 3 %      MCV 88 fL      MCH 30 6 pg      MCHC 34 6 g/dL      RDW 12 4 %      MPV 10 2 fL      Platelets 982 Thousands/uL      nRBC 0 /100 WBCs      Neutrophils Relative 56 %      Immat GRANS % 1 %      Lymphocytes Relative 35 %      Monocytes Relative 6 %      Eosinophils Relative 2 %      Basophils Relative 0 %      Neutrophils Absolute 5 82 Thousands/µL      Immature Grans Absolute 0 05 Thousand/uL      Lymphocytes Absolute 3 65 Thousands/µL      Monocytes Absolute 0 62 Thousand/µL      Eosinophils Absolute 0 25 Thousand/µL      Basophils Absolute 0 04 Thousands/µL     POCT pregnancy, urine [[id number]]  (Normal) Resulted: 01/17/23 2034    Lab Status: Final result Updated: 01/17/23 2035     EXT Preg Test, Ur Negative     Control Valid    Urine Macroscopic, POC [[id number]]  (Abnormal) Collected: 01/17/23 2032    Lab Status: Final result Specimen: Urine Updated: 01/17/23 2034     Color, UA Yellow     Clarity, UA Clear     pH, UA 6 0     Leukocytes, UA Negative     Nitrite, UA Negative     Protein, UA Negative mg/dl      Glucose, UA Negative mg/dl      Ketones, UA Negative mg/dl      Urobilinogen, UA 0 2 E U /dl      Bilirubin, UA Negative     Occult Blood, UA Trace     Specific [place], UA 1 025    Narrative:      CLINITEK RESULT    Rotavirus antigen, stool [[id number]]     Lab Status: No result Specimen: Stool from Rectum     Giardia lamblia, EIA and Ova and Parasites Examination [[id number]]     Lab Status: No result Specimen: Stool from Per Stoma     Stool Enteric Bacterial Panel by PCR [[id number]]     Lab Status: No result Specimen: Stool from Rectum                  CT abdomen pelvis with contrast   Final Result by Jeffrey Cheng MD (01/17 2300)      No acute intra-abdominal abnormality  No free air or free fluid  Workstation performed: [id number]                    Procedures  Procedures         ED Course  ED Course as of 01/17/23 2328 Tue Jan 17, 2023 2017 Patient is a 22-year-old female coming in today with 6 months of abdominal pain and diarrhea  Exam she is well-appearing no distress    Vital signs are stable afebrile nonperitoneal   Start medical work-up for CBC CMP lactic urine pregnancy as    well as send stool cultures and CT abdomen pelvis      Disclosure: Voice to text software was used in the preparation of this document and could have resulted in translational errors       Occasional wrong word or "sound a like" substitutions may have occurred due to the inherent limitations of voice recognition software   Read the chart carefully and recognize, using context, where substitutions have occurred  2210 Urine with mild blood but no evidence of UTI  Patient with mildly elevated WBC w/o bands  LA WNL  No evidence of septicemia   2215 Patient with mildly low mag at 1 8 - will give oral mag  Clear for CT as CMP w/o evidence of ALEXANDRO  Patient unable to give stool samples    2257 Patient resting in bed  Updated on labs as well as pending CT  Has had no diarrhea here  The Bentyl did help with her pain   2304 CT scan shows fatty liver as well as no other acute pathology  Plan for DC home  Will give Bentyl for home, referral to gastroenterology as well as scription's for stool cultures       Patient was given prescriptions for stool cultures as well as prescription  Patient states that she has have a family doctor appointment tomorrow  Encouraged her to keep this appointment as well as call for follow-up with gastroenterology                        SBIRT 22yo+    Flowsheet Row Most Recent Value   SBIRT (25 yo +)    In order to provide better care to our patients, we are screening all of our patients for alcohol and drug use  Would it be okay to ask you these screening questions?  No Filed at: 01/17/2023 2121                    MDM    Disposition  Final diagnoses:   Diarrhea   Abdominal pain   Hypomagnesemia   Fatty liver     Time reflects when diagnosis was documented in both MDM as applicable and the Disposition within this note     Time User Action Codes Description Comment    1/17/2023 10:16 PM [facility] Padmini Add [R19 7] Diarrhea     1/17/2023 10:16 PM Isael Ramirez L Add [R10 9] Abdominal pain     1/17/2023 10:16 PM [facility] Padmini Add [E83 42] Hypomagnesemia     1/17/2023 11:05 PM [facility] Padmini Add [K76 0] Fatty liver       ED Disposition     ED Disposition   Discharge    Condition   Stable    Date/Time   Tue Jan 17, 2023 11:05 PM    Comment   Debra Orlando discharge to home/self care                 Follow-up Information     Follow up With Specialties Details Why Contact Info Additional Information    Savage Hendricks DO Family Medicine Schedule an appointment as soon as possible for a visit in 1 week  [address] [facility] Schedule an appointment as soon as possible for a visit in 1 week  [address]  [facility] [address]  Nicholas Caraballo [address] [facility], [address], [address] [place] [address], [address] [phone]          Discharge Medication List as of 1/17/2023 11:06 PM      START taking these medications    Details   dicyclomine (BENTYL) 20 mg tablet Take 1 tablet (20 mg total) by mouth 2 (two) times a day as needed (abd cramping) for up to 4 days, Starting Tue 1/17/2023, Until Sat 1/21/2023 at 2359, Print         CONTINUE these medications which have NOT CHANGED    Details   bromocriptine (PARLODEL) 5 MG capsule 1 capsule, Historical Med      buPROPion (WELLBUTRIN XL) 300 mg 24 hr tablet Take 1 tablet by mouth every morning, Starting Thu 3/31/2022, Historical Med      celecoxib (CeleBREX) 100 mg capsule every 24 hours, Starting Wed 3/10/2021, Historical Med      desogestrel-ethinyl estradiol (APRI) 0 15-30 MG-MCG per tablet Take 1 tablet by mouth daily, Historical Med      levothyroxine 50 mcg tablet Take 50 mcg by mouth daily, Starting Wed 2/17/2021, Historical Med      !! norethindrone (AYGESTIN) 5 mg tablet Take 5 mg by mouth daily, Historical Med      !! norethindrone (Aygestin) 5 mg tablet 1 tab 3 times per day x 2 days, then 1 tab bid x 2 days then 1 daily as directed, Normal      norgestimate-ethinyl estradiol (Sprintec 28) 0 25-35 MG-MCG per tablet Take 1 tablet by mouth daily, Starting Wed 4/6/2022, Normal      traZODone (DESYREL) 50 mg tablet Starting Fri 2/25/2022, Historical Med       !! - Potential duplicate medications found  Please discuss with provider  Outpatient Discharge Orders   Stool Enteric Bacterial Panel by PCR   Standing Status: Future Standing Exp  Date: 01/17/24     Giardia antigen   Standing Status: Future Standing Exp  Date: 01/17/24     Rotavirus antigen, stool   Standing Status: Future Standing Exp   Date: 01/17/24       PDMP Review     None          ED Provider  Electronically Signed by           Talbert Claude, DO  01/17/23 3553

## 2023-01-18 NOTE — ED NOTES
Ambulatory to the bathroom to provide a urine sample at this time      Francesco Nielson RN  01/17/23 2027

## 2023-01-19 ENCOUNTER — HOSPITAL ENCOUNTER (EMERGENCY)
Facility: HOSPITAL | Age: 28
Discharge: HOME | End: 2023-01-19
Attending: EMERGENCY MEDICINE

## 2023-01-19 VITALS
OXYGEN SATURATION: 99 % | SYSTOLIC BLOOD PRESSURE: 143 MMHG | RESPIRATION RATE: 18 BRPM | HEART RATE: 68 BPM | DIASTOLIC BLOOD PRESSURE: 89 MMHG | BODY MASS INDEX: 46.87 KG/M2 | TEMPERATURE: 98 F | WEIGHT: 240 LBS

## 2023-01-19 DIAGNOSIS — R19.7 DIARRHEA: ICD-10-CM

## 2023-01-19 DIAGNOSIS — R11.2 NAUSEA AND VOMITING: Primary | ICD-10-CM

## 2023-01-19 LAB
ALBUMIN SERPL BCP-MCNC: 3.9 G/DL (ref 3.5–5)
ALP SERPL-CCNC: 85 U/L (ref 46–116)
ALT SERPL W P-5'-P-CCNC: 35 U/L (ref 12–78)
ANION GAP SERPL CALCULATED.3IONS-SCNC: 7 MMOL/L (ref 4–13)
AST SERPL W P-5'-P-CCNC: 38 U/L (ref 5–45)
BASOPHILS # BLD AUTO: 0.03 THOUSANDS/ÂΜL (ref 0–0.1)
BASOPHILS NFR BLD AUTO: 0 % (ref 0–1)
BILIRUB SERPL-MCNC: 0.4 MG/DL (ref 0.2–1)
BILIRUB UR QL STRIP: NEGATIVE
BUN SERPL-MCNC: 13 MG/DL (ref 5–25)
CALCIUM SERPL-MCNC: 8.4 MG/DL (ref 8.3–10.1)
CHLORIDE SERPL-SCNC: 106 MMOL/L (ref 96–108)
CLARITY UR: CLEAR
CO2 SERPL-SCNC: 26 MMOL/L (ref 21–32)
COLOR UR: YELLOW
CREAT SERPL-MCNC: 0.71 MG/DL (ref 0.6–1.3)
EOSINOPHIL # BLD AUTO: 0.24 THOUSAND/ÂΜL (ref 0–0.61)
EOSINOPHIL NFR BLD AUTO: 3 % (ref 0–6)
ERYTHROCYTE [DISTWIDTH] IN BLOOD BY AUTOMATED COUNT: 12.4 % (ref 11.6–15.1)
EXT PREGNANCY TEST URINE: NEGATIVE
EXT. CONTROL: NORMAL
G LAMBLIA AG STL QL IA: NEGATIVE
GFR SERPL CREATININE-BSD FRML MDRD: 118 ML/MIN/1.73SQ M
GLUCOSE SERPL-MCNC: 78 MG/DL (ref 65–140)
GLUCOSE UR STRIP-MCNC: NEGATIVE MG/DL
HCT VFR BLD AUTO: 42 % (ref 34.8–46.1)
HGB BLD-MCNC: 14.3 G/DL (ref 11.5–15.4)
HGB UR QL STRIP.AUTO: NEGATIVE
IMM GRANULOCYTES # BLD AUTO: 0.05 THOUSAND/UL (ref 0–0.2)
IMM GRANULOCYTES NFR BLD AUTO: 1 % (ref 0–2)
KETONES UR STRIP-MCNC: NEGATIVE MG/DL
LEUKOCYTE ESTERASE UR QL STRIP: NEGATIVE
LIPASE SERPL-CCNC: 85 U/L (ref 73–393)
LYMPHOCYTES # BLD AUTO: 2.83 THOUSANDS/ÂΜL (ref 0.6–4.47)
LYMPHOCYTES NFR BLD AUTO: 34 % (ref 14–44)
MCH RBC QN AUTO: 30.2 PG (ref 26.8–34.3)
MCHC RBC AUTO-ENTMCNC: 34 G/DL (ref 31.4–37.4)
MCV RBC AUTO: 89 FL (ref 82–98)
MONOCYTES # BLD AUTO: 0.53 THOUSAND/ÂΜL (ref 0.17–1.22)
MONOCYTES NFR BLD AUTO: 6 % (ref 4–12)
NEUTROPHILS # BLD AUTO: 4.6 THOUSANDS/ÂΜL (ref 1.85–7.62)
NEUTS SEG NFR BLD AUTO: 56 % (ref 43–75)
NITRITE UR QL STRIP: NEGATIVE
NRBC BLD AUTO-RTO: 0 /100 WBCS
PH UR STRIP.AUTO: 7 [PH]
PLATELET # BLD AUTO: 270 THOUSANDS/UL (ref 149–390)
PMV BLD AUTO: 9.9 FL (ref 8.9–12.7)
POTASSIUM SERPL-SCNC: 4.2 MMOL/L (ref 3.5–5.3)
PROT SERPL-MCNC: 7.9 G/DL (ref 6.4–8.4)
PROT UR STRIP-MCNC: NEGATIVE MG/DL
RBC # BLD AUTO: 4.74 MILLION/UL (ref 3.81–5.12)
RV AG STL QL: NEGATIVE
SODIUM SERPL-SCNC: 139 MMOL/L (ref 135–147)
SP GR UR STRIP.AUTO: 1.01 (ref 1–1.03)
UROBILINOGEN UR STRIP-ACNC: <2 MG/DL
WBC # BLD AUTO: 8.28 THOUSAND/UL (ref 4.31–10.16)

## 2023-01-19 RX ORDER — ONDANSETRON 2 MG/ML
4 INJECTION INTRAMUSCULAR; INTRAVENOUS ONCE
Status: COMPLETED | OUTPATIENT
Start: 2023-01-19 | End: 2023-01-19

## 2023-01-19 RX ORDER — ONDANSETRON 4 MG/1
4 TABLET, ORALLY DISINTEGRATING ORAL EVERY 6 HOURS PRN
Qty: 20 TABLET | Refills: 0 | Status: SHIPPED | OUTPATIENT
Start: 2023-01-19

## 2023-01-19 RX ADMIN — SODIUM CHLORIDE 1000 ML: 0.9 INJECTION, SOLUTION INTRAVENOUS at 16:21

## 2023-01-19 RX ADMIN — ONDANSETRON 4 MG: 2 INJECTION INTRAMUSCULAR; INTRAVENOUS at 16:21

## 2023-01-19 NOTE — DISCHARGE INSTRUCTIONS
Rest, increase fluids  Zofran as needed for nausea  Keep appointment with GI doctor for recheck  Return to ER if symptoms worsen

## 2023-01-19 NOTE — ED PROVIDER NOTES
History  Chief Complaint   Patient presents with   • Abdominal Pain     Patient complaint of vomiting and diarhhea x 2 months, was seen at two days ago at [facility] for same complaint     Patient is a 25 y/o F with h/o anxiety, HTN that presents to the ED with diarrhea x 6 month  She was seen at [facility] 2 days ago and given IV fluids, had labs checked and CT  She was unable to give a stool sample while in the ED, but states she did drop a sample off at the lab yesterday  She was told to f/u with GI and made an appt for   She states she called her PCP today because she started vomiting yesterday and was told to go to the ED for possible dehydration  She denies abdominal pain  LNMP was 1 month ago  She denies urinary symptoms  No blood in stool or vomit  No sick contacts, no bad food exposure  Stool culture is still pending  History provided by:  Patient  Vomiting  Severity:  Moderate  Duration:  1 day  Timing:  Constant  Progression:  Unchanged  Chronicity:  Chronic  Relieved by:  Nothing  Worsened by:  Nothing  Ineffective treatments:  None tried  Associated symptoms: diarrhea    Associated symptoms: no abdominal pain, no chills, no cough, no fever, no headaches, no sore throat and no URI    Risk factors: no alcohol use, no diabetes, not pregnant, no sick contacts, no suspect food intake and no travel to endemic areas        Prior to Admission Medications   Prescriptions Last Dose Informant Patient Reported?  Taking?   bromocriptine (PARLODEL) 5 MG capsule   Yes No   Si capsule   Patient not taking: Reported on 2022   buPROPion (WELLBUTRIN XL) 300 mg 24 hr tablet   Yes No   Sig: Take 1 tablet by mouth every morning   celecoxib (CeleBREX) 100 mg capsule   Yes No   Sig: every 24 hours   Patient not taking: Reported on 2022    desogestrel-ethinyl estradiol (APRI) 0 15-30 MG-MCG per tablet   Yes No   Sig: Take 1 tablet by mouth daily   dicyclomine (BENTYL) 20 mg tablet   No No   Sig: Take 1 tablet (20 mg total) by mouth 2 (two) times a day as needed (abd cramping) for up to 4 days   levothyroxine 50 mcg tablet   Yes No   Sig: Take 50 mcg by mouth daily   norethindrone (AYGESTIN) 5 mg tablet   Yes No   Sig: Take 5 mg by mouth daily   norethindrone (Aygestin) 5 mg tablet   No No   Si tab 3 times per day x 2 days, then 1 tab bid x 2 days then 1 daily as directed   norgestimate-ethinyl estradiol (Sprintec 28) 0 25-35 MG-MCG per tablet   No No   Sig: Take 1 tablet by mouth daily   traZODone (DESYREL) 50 mg tablet   Yes No      Facility-Administered Medications: None       Past Medical History:   Diagnosis Date   • Anxiety    • Disease of thyroid gland     hyperthyroid   • Elevated prolactin level    • Hypertension    • PCOS (polycystic ovarian syndrome)        History reviewed  No pertinent surgical history  Family History   Problem Relation Age of Onset   • No Known Problems Mother    • Breast cancer Neg Hx    • Uterine cancer Neg Hx    • Ovarian cancer Neg Hx      I have reviewed and agree with the history as documented  E-Cigarette/Vaping   • E-Cigarette Use Never User      E-Cigarette/Vaping Substances   • Nicotine No    • THC No    • CBD No    • Flavoring No    • Other No    • Unknown No      Social History     Tobacco Use   • Smoking status: Never   • Smokeless tobacco: Never   Vaping Use   • Vaping Use: Never used   Substance Use Topics   • Alcohol use: Yes     Comment: socially    • Drug use: Never       Review of Systems   Constitutional: Negative for chills and fever  HENT: Negative for congestion and sore throat  Respiratory: Negative for cough and shortness of breath  Cardiovascular: Negative for chest pain and leg swelling  Gastrointestinal: Positive for diarrhea, nausea and vomiting  Negative for abdominal pain, blood in stool and constipation  Genitourinary: Negative for dysuria  Musculoskeletal: Negative for back pain and neck pain     Skin: Negative for color change, pallor and rash  Neurological: Negative for dizziness, weakness, light-headedness, numbness and headaches  Psychiatric/Behavioral: Negative for confusion  All other systems reviewed and are negative  Physical Exam  Physical Exam  Vitals and nursing note reviewed  Constitutional:       General: She is not in acute distress  Appearance: Normal appearance  She is well-developed and well-groomed  She is morbidly obese  She is not ill-appearing or diaphoretic  HENT:      Head: Normocephalic and atraumatic  Right Ear: External ear normal       Left Ear: External ear normal       Nose: Nose normal       Mouth/Throat:      Mouth: Mucous membranes are moist       Pharynx: Oropharynx is clear  Eyes:      Conjunctiva/sclera: Conjunctivae normal       Pupils: Pupils are equal    Cardiovascular:      Rate and Rhythm: Normal rate and regular rhythm  Heart sounds: Normal heart sounds  Pulmonary:      Effort: Pulmonary effort is normal       Breath sounds: Normal breath sounds  No wheezing, rhonchi or rales  Abdominal:      General: Abdomen is flat  Bowel sounds are normal       Palpations: Abdomen is soft  Tenderness: There is no abdominal tenderness  Musculoskeletal:         General: Normal range of motion  Cervical back: Normal range of motion and neck supple  Skin:     General: Skin is warm and dry  Coloration: Skin is not jaundiced or pale  Findings: No rash  Neurological:      General: No focal deficit present  Mental Status: She is alert and oriented to person, place, and time  Motor: No weakness  Psychiatric:         Mood and Affect: Mood normal          Behavior: Behavior is cooperative           Vital Signs  ED Triage Vitals [01/19/23 1537]   Temperature Pulse Respirations Blood Pressure SpO2   98 °F (36 7 °C) 68 18 143/89 99 %      Temp src Heart Rate Source Patient Position - Orthostatic VS BP Location FiO2 (%)   -- -- -- -- --      Pain Score       --           Vitals:    01/19/23 1537   BP: 143/89   Pulse: 68         Visual Acuity      ED Medications  Medications   sodium chloride 0 9 % bolus 1,000 mL (0 mL Intravenous Stopped 1/19/23 1731)   ondansetron (ZOFRAN) injection 4 mg (4 mg Intravenous Given 1/19/23 1621)       Diagnostic Studies  Results Reviewed     Procedure Component Value Units Date/Time    UA w Reflex to Microscopic w Reflex to Culture [[id number]] Collected: 01/19/23 1733    Lab Status: Final result Specimen: Urine, Other Updated: 01/19/23 1750     Color, UA Yellow     Clarity, UA Clear     Specific Gravity, UA 1 015     pH, UA 7 0     Leukocytes, UA Negative     Nitrite, UA Negative     Protein, UA Negative mg/dl      Glucose, UA Negative mg/dl      Ketones, UA Negative mg/dl      Urobilinogen, UA <2 0 mg/dl      Bilirubin, UA Negative     Occult Blood, UA Negative    POCT pregnancy, urine [[id number]]  (Normal) Resulted: 01/19/23 1735    Lab Status: Final result Updated: 01/19/23 1735     EXT Preg Test, Ur Negative     Control Valid    Comprehensive metabolic panel [[id number]] Collected: 01/19/23 1621    Lab Status: Final result Specimen: Blood from Arm, Right Updated: 01/19/23 1647     Sodium 139 mmol/L      Potassium 4 2 mmol/L      Chloride 106 mmol/L      CO2 26 mmol/L      ANION GAP 7 mmol/L      BUN 13 mg/dL      Creatinine 0 71 mg/dL      Glucose 78 mg/dL      Calcium 8 4 mg/dL      AST 38 U/L      ALT 35 U/L      Alkaline Phosphatase 85 U/L      Total Protein 7 9 g/dL      Albumin 3 9 g/dL      Total Bilirubin 0 40 mg/dL      eGFR 118 ml/min/1 73sq m     Narrative:      [place] guidelines for Chronic Kidney Disease (CKD):   •  Stage 1 with normal or high GFR (GFR > 90 mL/min/1 73 square meters)  •  Stage 2 Mild CKD (GFR = 60-89 mL/min/1 73 square meters)  •  Stage 3A Moderate CKD (GFR = 45-59 mL/min/1 73 square meters)  •  Stage 3B Moderate CKD (GFR = 30-44 mL/min/1 73 square meters)  •  Stage 4 Severe CKD (GFR = 15-29 mL/min/1 73 square meters)  •  Stage 5 End Stage CKD (GFR <15 mL/min/1 73 square meters)  Note: GFR calculation is accurate only with a steady state creatinine    Lipase [[id number]]  (Normal) Collected: 01/19/23 1621    Lab Status: Final result Specimen: Blood from Arm, Right Updated: 01/19/23 1647     Lipase 85 u/L     CBC and differential [[id number]] Collected: 01/19/23 1621    Lab Status: Final result Specimen: Blood from Arm, Right Updated: 01/19/23 1631     WBC 8 28 Thousand/uL      RBC 4 74 Million/uL      Hemoglobin 14 3 g/dL      Hematocrit 42 0 %      MCV 89 fL      MCH 30 2 pg      MCHC 34 0 g/dL      RDW 12 4 %      MPV 9 9 fL      Platelets 474 Thousands/uL      nRBC 0 /100 WBCs      Neutrophils Relative 56 %      Immat GRANS % 1 %      Lymphocytes Relative 34 %      Monocytes Relative 6 %      Eosinophils Relative 3 %      Basophils Relative 0 %      Neutrophils Absolute 4 60 Thousands/µL      Immature Grans Absolute 0 05 Thousand/uL      Lymphocytes Absolute 2 83 Thousands/µL      Monocytes Absolute 0 53 Thousand/µL      Eosinophils Absolute 0 24 Thousand/µL      Basophils Absolute 0 03 Thousands/µL                  No orders to display              Procedures  Procedures         ED Course                                             Medical Decision Making  Patient with chronic diarrhea, has appt with GI, now with vomiting, will check labs to r/o dehydration and electrolyte abnormality, give IV fluids and zofran and reassess  Labs normal, abdominal exam benign, will D/C with zofran and advised f/u with GI   UA normal, preg negative  Diarrhea: acute illness or injury  Nausea and vomiting: acute illness or injury  Amount and/or Complexity of Data Reviewed  Labs: ordered  Risk  Prescription drug management            Disposition  Final diagnoses:   Nausea and vomiting   Diarrhea     Time reflects when diagnosis was documented in both MDM as applicable and the Disposition within this note     Time User Action Codes Description Comment    1/19/2023  5:52 PM [place] Pavy Add [R11 2] Nausea and vomiting     1/19/2023  5:52 PM [place] Pavy Add [R19 7] Diarrhea       ED Disposition     ED Disposition   Discharge    Condition   Stable    Date/Time   Thu Jan 19, 2023  5:52 PM    Comment   Kiersten Hills discharge to home/self care                 Follow-up Information     Follow up With Specialties Details Why Contact Info Additional Information    [facility] Schedule an appointment as soon as possible for a visit  For recheck [address] [place] [id number] [address] [facility] [address] [place] [address] [facility] [address] [facility]     [phone]          Discharge Medication List as of 1/19/2023  5:59 PM      START taking these medications    Details   ondansetron (ZOFRAN-ODT) 4 mg disintegrating tablet Take 1 tablet (4 mg total) by mouth every 6 (six) hours as needed for nausea or vomiting, Starting Thu 1/19/2023, Normal         CONTINUE these medications which have NOT CHANGED    Details   bromocriptine (PARLODEL) 5 MG capsule 1 capsule, Historical Med      buPROPion (WELLBUTRIN XL) 300 mg 24 hr tablet Take 1 tablet by mouth every morning, Starting Thu 3/31/2022, Historical Med      celecoxib (CeleBREX) 100 mg capsule every 24 hours, Starting Wed 3/10/2021, Historical Med      desogestrel-ethinyl estradiol (APRI) 0 15-30 MG-MCG per tablet Take 1 tablet by mouth daily, Historical Med      dicyclomine (BENTYL) 20 mg tablet Take 1 tablet (20 mg total) by mouth 2 (two) times a day as needed (abd cramping) for up to 4 days, Starting Tue 1/17/2023, Until Sat 1/21/2023 at 2359, Print      levothyroxine 50 mcg tablet Take 50 mcg by mouth daily, Starting Wed 2/17/2021, Historical Med      !! norethindrone (AYGESTIN) 5 mg tablet Take 5 mg by mouth daily, Historical Med      !! norethindrone (Aygestin) 5 mg tablet 1 tab 3 times per day x 2 days, then 1 tab bid x 2 days then 1 daily as directed, Normal      norgestimate-ethinyl estradiol (Sprintec 28) 0 25-35 MG-MCG per tablet Take 1 tablet by mouth daily, Starting Wed 4/6/2022, Normal      traZODone (DESYREL) 50 mg tablet Starting Fri 2/25/2022, Historical Med       !! - Potential duplicate medications found  Please discuss with provider  No discharge procedures on file      PDMP Review     None          ED Provider  Electronically Signed by           Pascual Zafar PA-C  01/19/23 5513

## 2025-03-10 ENCOUNTER — HOSPITAL ENCOUNTER (OUTPATIENT)
Dept: HOSPITAL 99 - WDC | Age: 30
End: 2025-03-10
Payer: COMMERCIAL

## 2025-03-10 DIAGNOSIS — N63.22: Primary | ICD-10-CM

## 2025-06-23 NOTE — PATIENT INSTRUCTIONS
Use over-the-counter Aspercreme or Voltaren gel over the right leg per usage instruction
250-245 lbs for a couple of months.  - BMI is 37.07, showing a decrease.  - Referral to Dr. Grady at the bariatrics department in the weight loss center at [facility] for further evaluation and management.  - Continue current medication regimen, including Mounjaro 10 mg subcutaneously weekly.    8. Health Maintenance.  - Blood work not yet completed.  - Physical exam: neurologically intact, no focal deficits.  - Comprehensive metabolic panel (CMP), complete blood count (CBC), and vitamin D level ordered.  - Instructed to fast after 10 PM the night before the blood work, only water allowed.    Follow-up  - Follow up in 3 months.  - Blood work to be completed within the next week or two.    Results  Labs   - A1c: 5.1%   - Microalbumin to creatinine ratio: 11  1. Type 2 diabetes mellitus without complication, without long-term current use of insulin (HCC)  -     POCT glycosylated hemoglobin (Hb A1C)  2. Essential hypertension  -     CBC with Auto Differential; Future  -     Comprehensive Metabolic Panel; Future  3. Mixed hyperlipidemia  -     Lipid Panel; Future  4. Vitamin D deficiency  -     Vitamin D 25 Hydroxy; Future  5. ALEX on CPAP  6. Prostate cancer metastatic to pelvis (HCC)  Comments:  been in remission with Dr. Phu Westfall for about 5 years.  7. Class 2 obesity due to excess calories without serious comorbidity with body mass index (BMI) of 37.0 to 37.9 in adult  -     [facility] - Brandon Grady MD, Bariatrics, [facility]    Return in about 3 months (around 9/23/2025) for to monitor medical conditions.       Subjective   History of Present Illness  The patient is a 64-year-old male who presents today for follow-up on his chronic medical problems, including diabetes, hypertension, hyperlipidemia, and GERD.    He reports overall good health since his last visit. He has been under the care of Dr. Drake for prostate cancer for the past 5 years, with the disease currently in remission. He is on